# Patient Record
Sex: MALE | Race: WHITE | Employment: PART TIME | URBAN - METROPOLITAN AREA
[De-identification: names, ages, dates, MRNs, and addresses within clinical notes are randomized per-mention and may not be internally consistent; named-entity substitution may affect disease eponyms.]

---

## 2017-05-11 ENCOUNTER — HOSPITAL ENCOUNTER (OUTPATIENT)
Dept: GENERAL RADIOLOGY | Age: 52
Discharge: HOME | End: 2017-05-11
Attending: INTERNAL MEDICINE | Admitting: INTERNAL MEDICINE

## 2017-05-11 LAB
ANION GAP SERPL CALCULATED.3IONS-SCNC: 19 MMOL/L (ref 4–16)
APTT: 30.4 SECONDS (ref 21.2–33)
BASOPHILS ABSOLUTE: 0.1 K/CU MM
BASOPHILS RELATIVE PERCENT: 0.7 % (ref 0–1)
BUN BLDV-MCNC: 9 MG/DL (ref 6–23)
CALCIUM SERPL-MCNC: 9.2 MG/DL (ref 8.3–10.6)
CHLORIDE BLD-SCNC: 97 MMOL/L (ref 99–110)
CO2: 23 MMOL/L (ref 21–32)
CREAT SERPL-MCNC: 0.8 MG/DL (ref 0.9–1.3)
DIFFERENTIAL TYPE: ABNORMAL
EOSINOPHILS ABSOLUTE: 0 K/CU MM
EOSINOPHILS RELATIVE PERCENT: 0.2 % (ref 0–3)
GFR AFRICAN AMERICAN: >60 ML/MIN/1.73M2
GFR NON-AFRICAN AMERICAN: >60 ML/MIN/1.73M2
GLUCOSE BLD-MCNC: 103 MG/DL (ref 70–140)
HCT VFR BLD CALC: 45.4 % (ref 42–52)
HEMOGLOBIN: 16.3 GM/DL (ref 13.5–18)
IMMATURE NEUTROPHIL %: 0.2 % (ref 0–0.43)
INR BLD: 1.07 INDEX
LYMPHOCYTES ABSOLUTE: 1.4 K/CU MM
LYMPHOCYTES RELATIVE PERCENT: 13 % (ref 24–44)
MACROCYTES: ABNORMAL
MCH RBC QN AUTO: 43 PG (ref 27–31)
MCHC RBC AUTO-ENTMCNC: 35.9 % (ref 32–36)
MCV RBC AUTO: 119.8 FL (ref 78–100)
MONOCYTES ABSOLUTE: 1 K/CU MM
MONOCYTES RELATIVE PERCENT: 9.9 % (ref 0–4)
NUCLEATED RBC %: 0 %
PDW BLD-RTO: 13.2 % (ref 11.7–14.9)
PLATELET # BLD: 263 K/CU MM (ref 140–440)
PMV BLD AUTO: 10.5 FL (ref 7.5–11.1)
POLYCHROMASIA: ABNORMAL
POTASSIUM SERPL-SCNC: 4.3 MMOL/L (ref 3.5–5.1)
PROTHROMBIN TIME: 12.2 SECONDS (ref 9.12–12.5)
RBC # BLD: 3.79 M/CU MM (ref 4.6–6.2)
SEGMENTED NEUTROPHILS ABSOLUTE COUNT: 8 K/CU MM
SEGMENTED NEUTROPHILS RELATIVE PERCENT: 76 % (ref 36–66)
SODIUM BLD-SCNC: 139 MMOL/L (ref 135–145)
TOTAL IMMATURE NEUTOROPHIL: 0.02 K/CU MM
TOTAL NUCLEATED RBC: 0 K/CU MM
WBC # BLD: 10.5 K/CU MM (ref 4–10.5)

## 2017-05-14 PROBLEM — I21.4 NSTEMI (NON-ST ELEVATED MYOCARDIAL INFARCTION) (HCC): Status: ACTIVE | Noted: 2017-05-14

## 2017-06-19 ENCOUNTER — HOSPITAL ENCOUNTER (OUTPATIENT)
Dept: GENERAL RADIOLOGY | Age: 52
Discharge: HOME | End: 2017-06-19
Attending: INTERNAL MEDICINE | Admitting: INTERNAL MEDICINE

## 2017-06-19 LAB
ALBUMIN SERPL-MCNC: 4.1 GM/DL (ref 3.4–5)
ALP BLD-CCNC: 94 IU/L (ref 40–129)
ALT SERPL-CCNC: 14 U/L (ref 10–40)
ANION GAP SERPL CALCULATED.3IONS-SCNC: 14 MMOL/L (ref 4–16)
AST SERPL-CCNC: 25 IU/L (ref 15–37)
BASOPHILS ABSOLUTE: 0.2 K/CU MM
BASOPHILS RELATIVE PERCENT: 2 % (ref 0–1)
BILIRUB SERPL-MCNC: 0.5 MG/DL (ref 0–1)
BUN BLDV-MCNC: 14 MG/DL (ref 6–23)
CALCIUM SERPL-MCNC: 9.1 MG/DL (ref 8.3–10.6)
CHLORIDE BLD-SCNC: 90 MMOL/L (ref 99–110)
CO2: 26 MMOL/L (ref 21–32)
CREAT SERPL-MCNC: 1.1 MG/DL (ref 0.9–1.3)
DIFFERENTIAL TYPE: ABNORMAL
EOSINOPHILS ABSOLUTE: 0.2 K/CU MM
EOSINOPHILS RELATIVE PERCENT: 2.2 % (ref 0–3)
GFR AFRICAN AMERICAN: >60 ML/MIN/1.73M2
GFR NON-AFRICAN AMERICAN: >60 ML/MIN/1.73M2
GLUCOSE BLD-MCNC: 111 MG/DL (ref 70–140)
HCT VFR BLD CALC: 42.1 % (ref 42–52)
HEMOGLOBIN: 14.8 GM/DL (ref 13.5–18)
IMMATURE NEUTROPHIL %: 0.5 % (ref 0–0.43)
LYMPHOCYTES ABSOLUTE: 2.1 K/CU MM
LYMPHOCYTES RELATIVE PERCENT: 26.3 % (ref 24–44)
MCH RBC QN AUTO: 39.6 PG (ref 27–31)
MCHC RBC AUTO-ENTMCNC: 35.2 % (ref 32–36)
MCV RBC AUTO: 112.6 FL (ref 78–100)
MONOCYTES ABSOLUTE: 1 K/CU MM
MONOCYTES RELATIVE PERCENT: 12.6 % (ref 0–4)
NUCLEATED RBC %: 0 %
PDW BLD-RTO: 15 % (ref 11.7–14.9)
PLATELET # BLD: 319 K/CU MM (ref 140–440)
PMV BLD AUTO: 10.2 FL (ref 7.5–11.1)
POTASSIUM SERPL-SCNC: 4.6 MMOL/L (ref 3.5–5.1)
RBC # BLD: 3.74 M/CU MM (ref 4.6–6.2)
SEGMENTED NEUTROPHILS ABSOLUTE COUNT: 4.4 K/CU MM
SEGMENTED NEUTROPHILS RELATIVE PERCENT: 56.4 % (ref 36–66)
SODIUM BLD-SCNC: 130 MMOL/L (ref 135–145)
TOTAL IMMATURE NEUTOROPHIL: 0.04 K/CU MM
TOTAL NUCLEATED RBC: 0 K/CU MM
TOTAL PROTEIN: 6.5 GM/DL (ref 6.4–8.2)
WBC # BLD: 7.9 K/CU MM (ref 4–10.5)

## 2017-06-21 PROBLEM — Z95.1 S/P CABG (CORONARY ARTERY BYPASS GRAFT): Status: ACTIVE | Noted: 2017-06-21

## 2017-12-12 PROBLEM — J18.9 PNEUMONIA: Status: ACTIVE | Noted: 2017-12-12

## 2019-07-10 ENCOUNTER — APPOINTMENT (OUTPATIENT)
Dept: CT IMAGING | Age: 54
End: 2019-07-10
Payer: COMMERCIAL

## 2019-07-10 ENCOUNTER — HOSPITAL ENCOUNTER (OUTPATIENT)
Age: 54
Setting detail: OBSERVATION
Discharge: HOME | End: 2019-07-12
Attending: EMERGENCY MEDICINE | Admitting: FAMILY MEDICINE
Payer: COMMERCIAL

## 2019-07-10 DIAGNOSIS — E87.6 HYPOKALEMIA: ICD-10-CM

## 2019-07-10 DIAGNOSIS — F41.8 DEPRESSION WITH ANXIETY: ICD-10-CM

## 2019-07-10 DIAGNOSIS — E83.42 HYPOMAGNESEMIA: ICD-10-CM

## 2019-07-10 DIAGNOSIS — I71.40 ABDOMINAL AORTIC ANEURYSM (AAA) WITHOUT RUPTURE: ICD-10-CM

## 2019-07-10 DIAGNOSIS — R11.2 NON-INTRACTABLE VOMITING WITH NAUSEA, UNSPECIFIED VOMITING TYPE: Primary | ICD-10-CM

## 2019-07-10 DIAGNOSIS — R94.31 PROLONGED QT INTERVAL: ICD-10-CM

## 2019-07-10 PROBLEM — E87.8 ELECTROLYTE DISTURBANCE: Status: ACTIVE | Noted: 2019-07-10

## 2019-07-10 LAB
ADENOVIRUS F 40 41 PCR: NOT DETECTED
ALBUMIN SERPL-MCNC: 3.1 GM/DL (ref 3.4–5)
ALP BLD-CCNC: 64 IU/L (ref 40–128)
ALT SERPL-CCNC: 8 U/L (ref 10–40)
AMPHETAMINES: NEGATIVE
ANION GAP SERPL CALCULATED.3IONS-SCNC: 14 MMOL/L (ref 4–16)
AST SERPL-CCNC: 24 IU/L (ref 15–37)
ASTROVIRUS PCR: NOT DETECTED
BACTERIA: NEGATIVE /HPF
BARBITURATE SCREEN URINE: NEGATIVE
BASOPHILS ABSOLUTE: 0.1 K/CU MM
BASOPHILS RELATIVE PERCENT: 0.5 % (ref 0–1)
BENZODIAZEPINE SCREEN, URINE: NEGATIVE
BILIRUB SERPL-MCNC: 0.8 MG/DL (ref 0–1)
BILIRUBIN URINE: NEGATIVE MG/DL
BLOOD, URINE: ABNORMAL
BUN BLDV-MCNC: 9 MG/DL (ref 6–23)
CALCIUM SERPL-MCNC: 8.1 MG/DL (ref 8.3–10.6)
CAMPYLOBACTER PCR: NOT DETECTED
CANNABINOID SCREEN URINE: NEGATIVE
CHLORIDE BLD-SCNC: 84 MMOL/L (ref 99–110)
CLARITY: CLEAR
CO2: 35 MMOL/L (ref 21–32)
COCAINE METABOLITE: NEGATIVE
COLOR: YELLOW
CREAT SERPL-MCNC: 0.8 MG/DL (ref 0.9–1.3)
CRYPTOSPORIDIUM PCR: NOT DETECTED
CYCLOSPORA CAYETANENSIS PCR: NOT DETECTED
DIFFERENTIAL TYPE: ABNORMAL
E COLI 0157 PCR: NOT DETECTED
E COLI ENTEROAGGREGATIVE PCR: NOT DETECTED
E COLI ENTEROPATHOGENIC PCR: NOT DETECTED
E COLI ENTEROTOXIGENIC PCR: NOT DETECTED
E COLI SHIGA LIKE TOXIN PCR: NOT DETECTED
E COLI SHIGELLA/ENTEROINVASIVE PCR: NOT DETECTED
ENTAMOEBA HISTOLYTICA PCR: NOT DETECTED
EOSINOPHILS ABSOLUTE: 0.1 K/CU MM
EOSINOPHILS RELATIVE PERCENT: 0.6 % (ref 0–3)
GFR AFRICAN AMERICAN: >60 ML/MIN/1.73M2
GFR NON-AFRICAN AMERICAN: >60 ML/MIN/1.73M2
GIARDIA LAMBLIA PCR: NOT DETECTED
GLUCOSE BLD-MCNC: 90 MG/DL (ref 70–99)
GLUCOSE, URINE: NEGATIVE MG/DL
HCT VFR BLD CALC: 39.2 % (ref 42–52)
HEMOGLOBIN: 14.1 GM/DL (ref 13.5–18)
HYALINE CASTS: 2 /LPF
IMMATURE NEUTROPHIL %: 0.5 % (ref 0–0.43)
KETONES, URINE: NEGATIVE MG/DL
LEUKOCYTE ESTERASE, URINE: NEGATIVE
LIPASE: 11 IU/L (ref 13–60)
LYMPHOCYTES ABSOLUTE: 1.2 K/CU MM
LYMPHOCYTES RELATIVE PERCENT: 11.7 % (ref 24–44)
MAGNESIUM: 1.5 MG/DL (ref 1.8–2.4)
MCH RBC QN AUTO: 38 PG (ref 27–31)
MCHC RBC AUTO-ENTMCNC: 36 % (ref 32–36)
MCV RBC AUTO: 105.7 FL (ref 78–100)
MONOCYTES ABSOLUTE: 0.7 K/CU MM
MONOCYTES RELATIVE PERCENT: 6.3 % (ref 0–4)
MUCUS: ABNORMAL HPF
NITRITE URINE, QUANTITATIVE: NEGATIVE
NOROVIRUS GI GII PCR: NOT DETECTED
NUCLEATED RBC %: 0 %
OPIATES, URINE: NEGATIVE
OXYCODONE: NORMAL
PDW BLD-RTO: 15.2 % (ref 11.7–14.9)
PH, URINE: 6 (ref 5–8)
PHENCYCLIDINE, URINE: NEGATIVE
PLATELET # BLD: 244 K/CU MM (ref 140–440)
PLESIOMONAS SHIGELLOIDES PCR: NOT DETECTED
PMV BLD AUTO: 9.9 FL (ref 7.5–11.1)
POTASSIUM SERPL-SCNC: ABNORMAL MMOL/L (ref 3.5–5.1)
PROTEIN UA: NEGATIVE MG/DL
RBC # BLD: 3.71 M/CU MM (ref 4.6–6.2)
RBC URINE: 1 /HPF (ref 0–3)
ROTAVIRUS A PCR: NOT DETECTED
SALMONELLA PCR: NOT DETECTED
SAPOVIRUS PCR: NOT DETECTED
SEGMENTED NEUTROPHILS ABSOLUTE COUNT: 8.4 K/CU MM
SEGMENTED NEUTROPHILS RELATIVE PERCENT: 80.4 % (ref 36–66)
SODIUM BLD-SCNC: 133 MMOL/L (ref 135–145)
SPECIFIC GRAVITY UA: 1.03 (ref 1–1.03)
SPECIFIC GRAVITY UA: ABNORMAL (ref 1–1.03)
SQUAMOUS EPITHELIAL: <1 /HPF
TOTAL IMMATURE NEUTOROPHIL: 0.05 K/CU MM
TOTAL NUCLEATED RBC: 0 K/CU MM
TOTAL PROTEIN: 5.3 GM/DL (ref 6.4–8.2)
TRICHOMONAS: ABNORMAL /HPF
TROPONIN T: <0.01 NG/ML
UROBILINOGEN, URINE: NORMAL MG/DL (ref 0.2–1)
VIBRIO CHOLERAE PCR: NOT DETECTED
VIBRIO PCR: NOT DETECTED
WBC # BLD: 10.5 K/CU MM (ref 4–10.5)
WBC UA: 1 /HPF (ref 0–2)
YERSINIA ENTEROCOLITICA PCR: NOT DETECTED

## 2019-07-10 PROCEDURE — 85025 COMPLETE CBC W/AUTO DIFF WBC: CPT

## 2019-07-10 PROCEDURE — 2580000003 HC RX 258: Performed by: EMERGENCY MEDICINE

## 2019-07-10 PROCEDURE — 96366 THER/PROPH/DIAG IV INF ADDON: CPT

## 2019-07-10 PROCEDURE — 36415 COLL VENOUS BLD VENIPUNCTURE: CPT

## 2019-07-10 PROCEDURE — G0378 HOSPITAL OBSERVATION PER HR: HCPCS

## 2019-07-10 PROCEDURE — 2500000003 HC RX 250 WO HCPCS: Performed by: NURSE PRACTITIONER

## 2019-07-10 PROCEDURE — 96376 TX/PRO/DX INJ SAME DRUG ADON: CPT

## 2019-07-10 PROCEDURE — 74177 CT ABD & PELVIS W/CONTRAST: CPT

## 2019-07-10 PROCEDURE — 83690 ASSAY OF LIPASE: CPT

## 2019-07-10 PROCEDURE — 83735 ASSAY OF MAGNESIUM: CPT

## 2019-07-10 PROCEDURE — 6370000000 HC RX 637 (ALT 250 FOR IP): Performed by: NURSE PRACTITIONER

## 2019-07-10 PROCEDURE — 96361 HYDRATE IV INFUSION ADD-ON: CPT

## 2019-07-10 PROCEDURE — 93010 ELECTROCARDIOGRAM REPORT: CPT | Performed by: INTERNAL MEDICINE

## 2019-07-10 PROCEDURE — 94761 N-INVAS EAR/PLS OXIMETRY MLT: CPT

## 2019-07-10 PROCEDURE — 72131 CT LUMBAR SPINE W/O DYE: CPT

## 2019-07-10 PROCEDURE — 6360000002 HC RX W HCPCS: Performed by: EMERGENCY MEDICINE

## 2019-07-10 PROCEDURE — 99285 EMERGENCY DEPT VISIT HI MDM: CPT

## 2019-07-10 PROCEDURE — 6370000000 HC RX 637 (ALT 250 FOR IP): Performed by: PHYSICIAN ASSISTANT

## 2019-07-10 PROCEDURE — 96365 THER/PROPH/DIAG IV INF INIT: CPT

## 2019-07-10 PROCEDURE — 94640 AIRWAY INHALATION TREATMENT: CPT

## 2019-07-10 PROCEDURE — 96372 THER/PROPH/DIAG INJ SC/IM: CPT

## 2019-07-10 PROCEDURE — 87507 IADNA-DNA/RNA PROBE TQ 12-25: CPT

## 2019-07-10 PROCEDURE — 87324 CLOSTRIDIUM AG IA: CPT

## 2019-07-10 PROCEDURE — 80307 DRUG TEST PRSMV CHEM ANLYZR: CPT

## 2019-07-10 PROCEDURE — 84484 ASSAY OF TROPONIN QUANT: CPT

## 2019-07-10 PROCEDURE — 93005 ELECTROCARDIOGRAM TRACING: CPT | Performed by: EMERGENCY MEDICINE

## 2019-07-10 PROCEDURE — 6360000004 HC RX CONTRAST MEDICATION: Performed by: EMERGENCY MEDICINE

## 2019-07-10 PROCEDURE — 80053 COMPREHEN METABOLIC PANEL: CPT

## 2019-07-10 PROCEDURE — 96375 TX/PRO/DX INJ NEW DRUG ADDON: CPT

## 2019-07-10 PROCEDURE — 6360000002 HC RX W HCPCS: Performed by: NURSE PRACTITIONER

## 2019-07-10 PROCEDURE — 81001 URINALYSIS AUTO W/SCOPE: CPT

## 2019-07-10 PROCEDURE — C9113 INJ PANTOPRAZOLE SODIUM, VIA: HCPCS | Performed by: NURSE PRACTITIONER

## 2019-07-10 PROCEDURE — 2580000003 HC RX 258: Performed by: NURSE PRACTITIONER

## 2019-07-10 PROCEDURE — 6370000000 HC RX 637 (ALT 250 FOR IP): Performed by: EMERGENCY MEDICINE

## 2019-07-10 RX ORDER — SODIUM CHLORIDE 0.9 % (FLUSH) 0.9 %
10 SYRINGE (ML) INJECTION PRN
Status: DISCONTINUED | OUTPATIENT
Start: 2019-07-10 | End: 2019-07-12 | Stop reason: HOSPADM

## 2019-07-10 RX ORDER — LORAZEPAM 2 MG/ML
3 INJECTION INTRAMUSCULAR
Status: DISCONTINUED | OUTPATIENT
Start: 2019-07-10 | End: 2019-07-12 | Stop reason: HOSPADM

## 2019-07-10 RX ORDER — SODIUM CHLORIDE 0.9 % (FLUSH) 0.9 %
10 SYRINGE (ML) INJECTION EVERY 12 HOURS SCHEDULED
Status: DISCONTINUED | OUTPATIENT
Start: 2019-07-10 | End: 2019-07-12 | Stop reason: HOSPADM

## 2019-07-10 RX ORDER — GUAIFENESIN 600 MG/1
600 TABLET, EXTENDED RELEASE ORAL 2 TIMES DAILY
Status: DISCONTINUED | OUTPATIENT
Start: 2019-07-10 | End: 2019-07-12 | Stop reason: HOSPADM

## 2019-07-10 RX ORDER — LORAZEPAM 2 MG/ML
2 INJECTION INTRAMUSCULAR
Status: DISCONTINUED | OUTPATIENT
Start: 2019-07-10 | End: 2019-07-12 | Stop reason: HOSPADM

## 2019-07-10 RX ORDER — CARVEDILOL 3.12 MG/1
3.12 TABLET ORAL 2 TIMES DAILY WITH MEALS
Status: DISCONTINUED | OUTPATIENT
Start: 2019-07-10 | End: 2019-07-12 | Stop reason: HOSPADM

## 2019-07-10 RX ORDER — 0.9 % SODIUM CHLORIDE 0.9 %
1000 INTRAVENOUS SOLUTION INTRAVENOUS ONCE
Status: COMPLETED | OUTPATIENT
Start: 2019-07-10 | End: 2019-07-10

## 2019-07-10 RX ORDER — LORAZEPAM 1 MG/1
3 TABLET ORAL
Status: DISCONTINUED | OUTPATIENT
Start: 2019-07-10 | End: 2019-07-12 | Stop reason: HOSPADM

## 2019-07-10 RX ORDER — MAGNESIUM SULFATE 1 G/100ML
1 INJECTION INTRAVENOUS PRN
Status: DISCONTINUED | OUTPATIENT
Start: 2019-07-10 | End: 2019-07-10

## 2019-07-10 RX ORDER — TRAZODONE HYDROCHLORIDE 50 MG/1
50 TABLET ORAL NIGHTLY
Status: DISCONTINUED | OUTPATIENT
Start: 2019-07-10 | End: 2019-07-12 | Stop reason: HOSPADM

## 2019-07-10 RX ORDER — FLUOXETINE HYDROCHLORIDE 20 MG/1
20 CAPSULE ORAL DAILY
Status: DISCONTINUED | OUTPATIENT
Start: 2019-07-11 | End: 2019-07-12 | Stop reason: HOSPADM

## 2019-07-10 RX ORDER — POTASSIUM CHLORIDE 1.5 G/1.77G
40 POWDER, FOR SOLUTION ORAL PRN
Status: DISCONTINUED | OUTPATIENT
Start: 2019-07-10 | End: 2019-07-10

## 2019-07-10 RX ORDER — SENNA PLUS 8.6 MG/1
1 TABLET ORAL DAILY PRN
Status: DISCONTINUED | OUTPATIENT
Start: 2019-07-10 | End: 2019-07-12 | Stop reason: HOSPADM

## 2019-07-10 RX ORDER — MAGNESIUM SULFATE 1 G/100ML
1 INJECTION INTRAVENOUS PRN
Status: DISCONTINUED | OUTPATIENT
Start: 2019-07-11 | End: 2019-07-12

## 2019-07-10 RX ORDER — LORATADINE 10 MG/1
10 TABLET ORAL DAILY
COMMUNITY

## 2019-07-10 RX ORDER — LORAZEPAM 1 MG/1
2 TABLET ORAL
Status: DISCONTINUED | OUTPATIENT
Start: 2019-07-10 | End: 2019-07-12 | Stop reason: HOSPADM

## 2019-07-10 RX ORDER — MAGNESIUM SULFATE IN WATER 40 MG/ML
2 INJECTION, SOLUTION INTRAVENOUS ONCE
Status: COMPLETED | OUTPATIENT
Start: 2019-07-10 | End: 2019-07-10

## 2019-07-10 RX ORDER — FLUTICASONE PROPIONATE 50 MCG
1 SPRAY, SUSPENSION (ML) NASAL DAILY
Status: DISCONTINUED | OUTPATIENT
Start: 2019-07-10 | End: 2019-07-12 | Stop reason: HOSPADM

## 2019-07-10 RX ORDER — PANTOPRAZOLE SODIUM 40 MG/10ML
40 INJECTION, POWDER, LYOPHILIZED, FOR SOLUTION INTRAVENOUS DAILY
Status: DISCONTINUED | OUTPATIENT
Start: 2019-07-10 | End: 2019-07-12 | Stop reason: HOSPADM

## 2019-07-10 RX ORDER — LORAZEPAM 2 MG/ML
1 INJECTION INTRAMUSCULAR
Status: DISCONTINUED | OUTPATIENT
Start: 2019-07-10 | End: 2019-07-12 | Stop reason: HOSPADM

## 2019-07-10 RX ORDER — POTASSIUM CHLORIDE 20 MEQ/1
40 TABLET, EXTENDED RELEASE ORAL PRN
Status: DISCONTINUED | OUTPATIENT
Start: 2019-07-10 | End: 2019-07-12

## 2019-07-10 RX ORDER — HYDROCODONE BITARTRATE AND ACETAMINOPHEN 5; 325 MG/1; MG/1
1 TABLET ORAL
Status: COMPLETED | OUTPATIENT
Start: 2019-07-10 | End: 2019-07-10

## 2019-07-10 RX ORDER — POTASSIUM CHLORIDE 7.45 MG/ML
10 INJECTION INTRAVENOUS PRN
Status: DISCONTINUED | OUTPATIENT
Start: 2019-07-10 | End: 2019-07-12

## 2019-07-10 RX ORDER — LORAZEPAM 1 MG/1
4 TABLET ORAL
Status: DISCONTINUED | OUTPATIENT
Start: 2019-07-10 | End: 2019-07-12 | Stop reason: HOSPADM

## 2019-07-10 RX ORDER — POTASSIUM CHLORIDE 20MEQ/15ML
40 LIQUID (ML) ORAL PRN
Status: DISCONTINUED | OUTPATIENT
Start: 2019-07-10 | End: 2019-07-12

## 2019-07-10 RX ORDER — ASPIRIN 81 MG/1
81 TABLET ORAL DAILY
Status: DISCONTINUED | OUTPATIENT
Start: 2019-07-11 | End: 2019-07-12 | Stop reason: HOSPADM

## 2019-07-10 RX ORDER — LORAZEPAM 1 MG/1
1 TABLET ORAL
Status: DISCONTINUED | OUTPATIENT
Start: 2019-07-10 | End: 2019-07-12 | Stop reason: HOSPADM

## 2019-07-10 RX ORDER — TRAZODONE HYDROCHLORIDE 50 MG/1
50 TABLET ORAL NIGHTLY
COMMUNITY

## 2019-07-10 RX ORDER — POTASSIUM CHLORIDE 20 MEQ/1
40 TABLET, EXTENDED RELEASE ORAL PRN
Status: DISCONTINUED | OUTPATIENT
Start: 2019-07-10 | End: 2019-07-10

## 2019-07-10 RX ORDER — LISINOPRIL 2.5 MG/1
2.5 TABLET ORAL DAILY
Status: DISCONTINUED | OUTPATIENT
Start: 2019-07-11 | End: 2019-07-12 | Stop reason: HOSPADM

## 2019-07-10 RX ORDER — PROMETHAZINE HYDROCHLORIDE 25 MG/ML
25 INJECTION, SOLUTION INTRAMUSCULAR; INTRAVENOUS EVERY 6 HOURS PRN
Status: DISCONTINUED | OUTPATIENT
Start: 2019-07-10 | End: 2019-07-12 | Stop reason: HOSPADM

## 2019-07-10 RX ORDER — 0.9 % SODIUM CHLORIDE 0.9 %
10 VIAL (ML) INJECTION
Status: COMPLETED | OUTPATIENT
Start: 2019-07-10 | End: 2019-07-10

## 2019-07-10 RX ORDER — ASPIRIN 81 MG/1
81 TABLET ORAL DAILY
COMMUNITY

## 2019-07-10 RX ORDER — IPRATROPIUM BROMIDE AND ALBUTEROL SULFATE 2.5; .5 MG/3ML; MG/3ML
1 SOLUTION RESPIRATORY (INHALATION)
Status: DISCONTINUED | OUTPATIENT
Start: 2019-07-10 | End: 2019-07-12 | Stop reason: HOSPADM

## 2019-07-10 RX ORDER — SPIRONOLACTONE 25 MG/1
12.5 TABLET ORAL DAILY
Status: DISCONTINUED | OUTPATIENT
Start: 2019-07-11 | End: 2019-07-12 | Stop reason: HOSPADM

## 2019-07-10 RX ORDER — ACETAMINOPHEN 325 MG/1
650 TABLET ORAL EVERY 4 HOURS PRN
Status: DISCONTINUED | OUTPATIENT
Start: 2019-07-10 | End: 2019-07-12 | Stop reason: HOSPADM

## 2019-07-10 RX ORDER — ONDANSETRON 2 MG/ML
4 INJECTION INTRAMUSCULAR; INTRAVENOUS EVERY 6 HOURS PRN
Status: DISCONTINUED | OUTPATIENT
Start: 2019-07-10 | End: 2019-07-12 | Stop reason: HOSPADM

## 2019-07-10 RX ORDER — CETIRIZINE HYDROCHLORIDE 10 MG/1
5 TABLET ORAL DAILY
Status: DISCONTINUED | OUTPATIENT
Start: 2019-07-11 | End: 2019-07-12 | Stop reason: HOSPADM

## 2019-07-10 RX ORDER — SODIUM CHLORIDE 9 MG/ML
INJECTION, SOLUTION INTRAVENOUS CONTINUOUS
Status: DISCONTINUED | OUTPATIENT
Start: 2019-07-10 | End: 2019-07-11

## 2019-07-10 RX ORDER — POTASSIUM CHLORIDE 7.45 MG/ML
10 INJECTION INTRAVENOUS PRN
Status: DISCONTINUED | OUTPATIENT
Start: 2019-07-10 | End: 2019-07-10

## 2019-07-10 RX ORDER — LORAZEPAM 2 MG/ML
4 INJECTION INTRAMUSCULAR
Status: DISCONTINUED | OUTPATIENT
Start: 2019-07-10 | End: 2019-07-12 | Stop reason: HOSPADM

## 2019-07-10 RX ORDER — SODIUM CHLORIDE 0.9 % (FLUSH) 0.9 %
10 SYRINGE (ML) INJECTION PRN
Status: CANCELLED | OUTPATIENT
Start: 2019-07-10

## 2019-07-10 RX ADMIN — POTASSIUM CHLORIDE 10 MEQ: 10 INJECTION, SOLUTION INTRAVENOUS at 16:24

## 2019-07-10 RX ADMIN — POTASSIUM CHLORIDE 10 MEQ: 10 INJECTION, SOLUTION INTRAVENOUS at 17:31

## 2019-07-10 RX ADMIN — GUAIFENESIN 600 MG: 600 TABLET, EXTENDED RELEASE ORAL at 20:42

## 2019-07-10 RX ADMIN — POTASSIUM CHLORIDE 10 MEQ: 10 INJECTION, SOLUTION INTRAVENOUS at 19:08

## 2019-07-10 RX ADMIN — HYDROCODONE BITARTRATE AND ACETAMINOPHEN 1 TABLET: 5; 325 TABLET ORAL at 21:22

## 2019-07-10 RX ADMIN — POTASSIUM CHLORIDE 10 MEQ: 10 INJECTION, SOLUTION INTRAVENOUS at 20:42

## 2019-07-10 RX ADMIN — SODIUM CHLORIDE 1000 ML: 9 INJECTION, SOLUTION INTRAVENOUS at 12:48

## 2019-07-10 RX ADMIN — ENOXAPARIN SODIUM 40 MG: 40 INJECTION SUBCUTANEOUS at 17:31

## 2019-07-10 RX ADMIN — PANTOPRAZOLE SODIUM 40 MG: 40 INJECTION, POWDER, FOR SOLUTION INTRAVENOUS at 17:31

## 2019-07-10 RX ADMIN — POTASSIUM CHLORIDE 40 MEQ: 20 SOLUTION ORAL at 11:54

## 2019-07-10 RX ADMIN — TRAZODONE HYDROCHLORIDE 50 MG: 50 TABLET ORAL at 20:42

## 2019-07-10 RX ADMIN — MAGNESIUM SULFATE HEPTAHYDRATE 2 G: 40 INJECTION, SOLUTION INTRAVENOUS at 12:47

## 2019-07-10 RX ADMIN — SODIUM CHLORIDE 10 ML: 9 INJECTION, SOLUTION INTRAMUSCULAR; INTRAVENOUS; SUBCUTANEOUS at 11:49

## 2019-07-10 RX ADMIN — IPRATROPIUM BROMIDE AND ALBUTEROL SULFATE 1 AMPULE: .5; 3 SOLUTION RESPIRATORY (INHALATION) at 21:50

## 2019-07-10 RX ADMIN — SODIUM CHLORIDE: 9 INJECTION, SOLUTION INTRAVENOUS at 16:05

## 2019-07-10 RX ADMIN — POTASSIUM CHLORIDE 10 MEQ: 10 INJECTION, SOLUTION INTRAVENOUS at 21:48

## 2019-07-10 RX ADMIN — IOPAMIDOL 75 ML: 755 INJECTION, SOLUTION INTRAVENOUS at 11:49

## 2019-07-10 RX ADMIN — FOLIC ACID: 5 INJECTION, SOLUTION INTRAMUSCULAR; INTRAVENOUS; SUBCUTANEOUS at 21:49

## 2019-07-10 RX ADMIN — SODIUM CHLORIDE 1000 ML: 9 INJECTION, SOLUTION INTRAVENOUS at 11:20

## 2019-07-10 RX ADMIN — POTASSIUM CHLORIDE 10 MEQ: 10 INJECTION, SOLUTION INTRAVENOUS at 14:53

## 2019-07-10 ASSESSMENT — PAIN DESCRIPTION - PAIN TYPE
TYPE: ACUTE PAIN
TYPE: CHRONIC PAIN

## 2019-07-10 ASSESSMENT — PAIN DESCRIPTION - DESCRIPTORS: DESCRIPTORS: ACHING;DISCOMFORT

## 2019-07-10 ASSESSMENT — PAIN SCALES - GENERAL
PAINLEVEL_OUTOF10: 0
PAINLEVEL_OUTOF10: 9
PAINLEVEL_OUTOF10: 9

## 2019-07-10 ASSESSMENT — PAIN DESCRIPTION - LOCATION
LOCATION: BACK
LOCATION: BACK

## 2019-07-10 ASSESSMENT — PAIN DESCRIPTION - PROGRESSION: CLINICAL_PROGRESSION: NOT CHANGED

## 2019-07-10 ASSESSMENT — PAIN DESCRIPTION - ORIENTATION: ORIENTATION: MID;LOWER

## 2019-07-10 ASSESSMENT — PAIN DESCRIPTION - ONSET: ONSET: ON-GOING

## 2019-07-10 ASSESSMENT — PAIN DESCRIPTION - FREQUENCY: FREQUENCY: CONTINUOUS

## 2019-07-10 ASSESSMENT — PAIN - FUNCTIONAL ASSESSMENT: PAIN_FUNCTIONAL_ASSESSMENT: ACTIVITIES ARE NOT PREVENTED

## 2019-07-10 NOTE — ED NOTES
[id number] Assistant at the bedside at this time.      Nikki Augustine RN  07/10/19 9862

## 2019-07-10 NOTE — H&P
History and Physical      Name:  Surya Rios /Age/Sex: 1965  (48 y.o. male)   MRN & CSN:  [id number] & [id number] Admission Date/Time: 7/10/2019 10:22 AM   Location:  ED15/ED-15 PCP: Saintclair Bali, [address] [facility] Day: 1        Admitting Physician: Dr. Melvin Valerio and Plan:   Surya Rios is a 48 y.o. male who presents with  Emesis (2 days, last drank alcohol yesterday) and Back Pain (worse with movement, middle back, radiating up to shoulders and down to knees)     Electrolyte disturbances   Hyponatremia (133)    Hypokalemia (2.7)   Hypomagnesemia (1.5)    Replacement protocol initiated      Intractable N/V- suspect gastritis component   PRN antiemetics   IV hydration   Monitor labs     Pneumonitis- possibly 2/2 aspiration with several days of vomitting. Does not appears to    Respiratory interventions- IS, supplemental oxygen, spot check pulse oximetry, and TCDB. Add prn bronchodilators, expectorants, and antitussives.  Alcohol abuse 1/2 liter vodka per day. Last drink 19   CIWA     Essential hypertension- continue home antihypertensive regimen- Monitor BP trends.  Tobacco Use   Smoking cessation protocol   Nicotine supplementation-refused         Patient case discussed with ED provider    Diet cardiac   DVT Prophylaxis [x] Lovenox, []  Heparin, [] SCDs, [] Ambulation  [] Long term AC   GI Prophylaxis [] PPI,  [] H2 Blocker,  [] Carafate,  [] Diet/Tube Feeds   Code Status Full     Disposition Admit to inpatient.     Patient plans to return home upon discharge   MDM [] Low, [x] Moderate,[]  High     -Patient assessment and plan discussed and reviewed with admitting physician: Carlos Tsang MD.     History of Present Illness:     Chief Complaint: Emesis (2 days, last drank alcohol yesterday) and Back Pain (worse with movement, middle back, radiating up to shoulders and down to knees)    Surya Rios is a 48 y.o. male who presents with above club or organization: None     Attends meetings of clubs or organizations: None     Relationship status: None    Intimate partner violence:     Fear of current or ex partner: None     Emotionally abused: None     Physically abused: None     Forced sexual activity: None   Other Topics Concern    None   Social History Narrative    None     TOBACCO:   reports that he has been smoking cigarettes. He has a 34.00 pack-year smoking history. He has never used smokeless tobacco.  ETOH:   reports that he drinks about 2.4 oz of alcohol per week. Drugs:  reports that he does not use drugs. Allergies: No Known Allergies    Home Medications:     Prior to Admission medications    Medication Sig Start Date End Date Taking? Authorizing Provider   guaiFENesin (MUCINEX) 600 MG extended release tablet Take 1 tablet by mouth 2 times daily 12/14/17   Endy Pearce MD   furosemide (LASIX) 20 MG tablet Take 1 tablet by mouth 2 times daily 5/22/17   Santino Davis MD   spironolactone (ALDACTONE) 25 MG tablet Take 0.5 tablets by mouth daily  Patient taking differently: Take 25 mg by mouth daily  5/22/17   Santino Davis MD   carvedilol (COREG) 3.125 MG tablet Take 1 tablet by mouth 2 times daily (with meals) 5/21/17   Ralph Parker MD   fluticasone (FLONASE) 50 MCG/ACT nasal spray 1 spray by Nasal route daily    Historical Provider, MD   ranolazine (RANEXA) 500 MG extended release tablet Take 1 tablet by mouth 2 times daily 5/16/17   Waleska Rodriguez MD   lisinopril (PRINIVIL;ZESTRIL) 2.5 MG tablet Take 1 tablet by mouth daily 5/16/17   Waleska Rodriguez MD   atorvastatin (LIPITOR) 10 MG tablet Take 1 tablet by mouth daily 6/17/15   Kushal Clark MD   FLUoxetine (PROZAC) 20 MG capsule Take 3 capsules by mouth daily 6/17/15   Kushal Clark MD   Cyanocobalamin (B-12) 100 MCG TABS Take 1 tablet by mouth.     Historical Provider, MD     Medications:   Medications:    magnesium sulfate  2 g Intravenous Once      Infusions:   PRN Meds:

## 2019-07-10 NOTE — ED PROVIDER NOTES
Allergies    SOCIAL AND FAMILY HISTORY    Social History     Socioeconomic History    Marital status:      Spouse name: Radha Brian Number of children: None    Years of education: None    Highest education level: None   Occupational History    None   Social Needs    Financial resource strain: None    Food insecurity:     Worry: None     Inability: None    Transportation needs:     Medical: None     Non-medical: None   Tobacco Use    Smoking status: Current Every Day Smoker     Packs/day: 1.00     Years: 34.00     Pack years: 34.00     Types: Cigarettes    Smokeless tobacco: Never Used   Substance and Sexual Activity    Alcohol use: Yes     Alcohol/week: 2.4 oz     Types: 4 Shots of liquor per week     Comment: drinks approx 1/2 liter vodka daily for \"years\"    Drug use: No    Sexual activity: Not Currently   Lifestyle    Physical activity:     Days per week: None     Minutes per session: None    Stress: None   Relationships    Social connections:     Talks on phone: None     Gets together: None     Attends [employer]ze service: None     Active member of club or organization: None     Attends meetings of clubs or organizations: None     Relationship status: None    Intimate partner violence:     Fear of current or ex partner: None     Emotionally abused: None     Physically abused: None     Forced sexual activity: None   Other Topics Concern    None   Social History Narrative    None     Family History   Problem Relation Age of Onset    Cancer Other         lung          PHYSICAL EXAM    VITAL SIGNS: BP (!) 91/54   Pulse 85   Temp 98.3 °F (36.8 °C)   Resp 20   Ht 5' 6\" (1.676 m)   Wt 174 lb (78.9 kg)   SpO2 91%   BMI 28.08 kg/m²    Constitutional:  Well developed, No acute distress. HENT:  Normocephalic, Atraumatic, PERRL. EOMI. Sclera clear. Conjunctiva normal.  Neck: supple without ridigity  Cardiovascular:  Regular rate and rhythm, No murmurs  Respiratory:  Nonlabored breathing. previous. RADIOLOGY    Ct Lumbar Spine Wo Contrast    Result Date: 7/10/2019  EXAMINATION: CT OF THE LUMBAR SPINE WITHOUT CONTRAST  7/10/2019 TECHNIQUE: CT of the lumbar spine was performed without the administration of intravenous contrast. Multiplanar reformatted images are provided for review. Dose modulation, iterative reconstruction, and/or weight based adjustment of the mA/kV was utilized to reduce the radiation dose to as low as reasonably achievable. COMPARISON: None HISTORY: ORDERING SYSTEM PROVIDED HISTORY: low back pain TECHNOLOGIST PROVIDED HISTORY: Reason for exam:->low back pain Reason for Exam: LOW BACK PAIN, NKI Acuity: Acute Type of Exam: Initial Additional signs and symptoms: NONE Relevant Medical/Surgical History: NONE FINDINGS: BONES/ALIGNMENT: There is normal alignment of the spine. The vertebral body heights are maintained. No osseous destructive lesion is seen. No acute fracture is identified. DEGENERATIVE CHANGES: Diffuse mild to moderate degenerative changes identified within the lumbar spine. At L1-L2 the central canal and neural foramina are patent. At L2-L3 annular disc bulge, facet arthropathy and ligamentum flavum hypertrophy results in mild central canal stenosis. Both foramina are moderately stenotic. At L3-L4 moderate central canal stenosis identified due to annular bulge, facet arthropathy and ligamentum flavum hypertrophy. Both foramina are moderately stenotic. At L4-L5 severe bilateral foraminal stenosis is appreciated. Left paracentral osteophyte deforms the thecal sac. Central canal is mildly stenotic. At L5-S1 severe right foraminal stenosis and moderate left foraminal stenosis are identified. Shallow disc bulge flattens the thecal sac. SOFT TISSUES/RETROPERITONEUM: No paraspinal mass is seen. No evidence of acute fracture. Moderate central canal stenosis and moderate bilateral foraminal stenosis at L3-L4. Mild central canal stenosis at L2-L3 and L4-L5.   Moderate severe bilateral foraminal stenosis is noted. Moderate to severe bilateral foraminal stenosis at L5-S1 greater on the right. Ct Abdomen Pelvis W Iv Contrast    Result Date: 7/10/2019  EXAMINATION: CT OF THE ABDOMEN AND PELVIS WITH CONTRAST 7/10/2019 11:48 am TECHNIQUE: CT of the abdomen and pelvis was performed with the administration of intravenous contrast. Multiplanar reformatted images are provided for review. Dose modulation, iterative reconstruction, and/or weight based adjustment of the mA/kV was utilized to reduce the radiation dose to as low as reasonably achievable. COMPARISON: 05/13/2017. HISTORY: ORDERING SYSTEM PROVIDED HISTORY: epigastric pain, nausea, vomiting TECHNOLOGIST PROVIDED HISTORY: IV contrast only. Thank you. Reason for Exam: epigastric pain, nausea, vomiting Acuity: Acute Type of Exam: Initial Additional signs and symptoms: NONE Relevant Medical/Surgical History: 75ML SIOVUE 370/ GFR>60 FINDINGS: Lower Chest: The lung bases demonstrate patchy areas of ground-glass opacity. Organs: The liver, spleen, gallbladder, pancreas and adrenal glands appear unremarkable. There is symmetric enhancement of the kidneys. No hydronephrosis is seen. GI/Bowel: Evaluation of the bowel is limited as no enteric contrast was given. No dilated loops of bowel are seen. The appendix is not dilated. Pelvis: No pelvic masses or fluid collections are seen. Peritoneum/Retroperitoneum: The abdominal aorta is mildly aneurysmal measuring 2.8 x 2.8 cm. No retroperitoneal or mesenteric lymphadenopathy is seen. Bones/Soft Tissues: There are degenerative changes involving the spine and right hip. No acute bony abnormalities are noted. There is a fluid collections seen adjacent to the right hip measuring 3.8 x 1.9 cm.     1. Patchy areas of ground-glass opacity involving the lung bases likely representing pneumonitis. 2. No acute intra-abdominal abnormality. 3. No acute intrapelvic abnormality.  4. Mild aneurysmal

## 2019-07-10 NOTE — ED NOTES
Given urinal and curtain pulled for privacy. The patient sts he has been nauseated and coughing up phlegm times 3 days. Soft stools but not diarrhea. He has not been eating and not actual vomiting emesis. He complains of feeling very weak. The patient has chronic back pain.       Kristina Cooley RN  07/10/19 8155

## 2019-07-10 NOTE — PROGRESS NOTES
Medication History  [facility]    Patient Name: Myah Ballard 1965     Medication history has been completed by: Marti Schulz CPhT    Source(s) of information: patient and retail pharmacist     Primary Care Physician: SAIRA Dinero - CNP     Pharmacy: [facility]    Allergies as of 07/10/2019    (No Known Allergies)        Prior to Admission medications    Medication Sig Start Date End Date Taking? Authorizing Provider   aspirin 81 MG EC tablet Take 81 mg by mouth daily   Yes Historical Provider, MD   traZODone (DESYREL) 50 MG tablet Take 50 mg by mouth nightly   Yes Historical Provider, MD   loratadine (CLARITIN) 10 MG tablet Take 10 mg by mouth daily   Yes Historical Provider, MD   furosemide (LASIX) 20 MG tablet Take 1 tablet by mouth 2 times daily  Patient taking differently: Take 20 mg by mouth daily  5/22/17  Yes Alessandra Wilson MD   spironolactone (ALDACTONE) 25 MG tablet Take 0.5 tablets by mouth daily 5/22/17  Yes Alessandra Wilson MD   carvedilol (COREG) 3.125 MG tablet Take 1 tablet by mouth 2 times daily (with meals) 5/21/17  Yes Ro Pop MD   lisinopril (PRINIVIL;ZESTRIL) 2.5 MG tablet Take 1 tablet by mouth daily 5/16/17  Yes Nora Pimentel MD   atorvastatin (LIPITOR) 10 MG tablet Take 1 tablet by mouth daily 6/17/15  Yes Chalo Vigil MD   FLUoxetine (PROZAC) 20 MG capsule Take 3 capsules by mouth daily  Patient taking differently: Take 20 mg by mouth daily  6/17/15  Yes Chalo Vigil MD   Cyanocobalamin (B-12) 100 MCG TABS Take 1 tablet by mouth. Yes Historical Provider, MD   guaiFENesin (MUCINEX) 600 MG extended release tablet Take 1 tablet by mouth 2 times daily 12/14/17   Patel Garay MD   fluticasone (FLONASE) 50 MCG/ACT nasal spray 1 spray by Nasal route daily    Historical Provider, MD     Medications added or changed (ex.  new medication, dosage change, interval change, formulation change):  Aspirin (added)  Loratadine (added)  Trazodone

## 2019-07-11 LAB
ALBUMIN SERPL-MCNC: 2.5 GM/DL (ref 3.4–5)
ALP BLD-CCNC: 50 IU/L (ref 40–128)
ALT SERPL-CCNC: 6 U/L (ref 10–40)
AMYLASE: 18 U/L (ref 25–115)
ANION GAP SERPL CALCULATED.3IONS-SCNC: 8 MMOL/L (ref 4–16)
AST SERPL-CCNC: 17 IU/L (ref 15–37)
BASOPHILS ABSOLUTE: 0 K/CU MM
BASOPHILS RELATIVE PERCENT: 0.7 % (ref 0–1)
BILIRUB SERPL-MCNC: 0.8 MG/DL (ref 0–1)
BUN BLDV-MCNC: 7 MG/DL (ref 6–23)
CALCIUM SERPL-MCNC: 7.3 MG/DL (ref 8.3–10.6)
CHLORIDE BLD-SCNC: 98 MMOL/L (ref 99–110)
CLOSTRIDIUM DIFFICILE, PCR: ABNORMAL
CLOSTRIDIUM DIFFICILE, PCR: ABNORMAL
CO2: 32 MMOL/L (ref 21–32)
CREAT SERPL-MCNC: 0.5 MG/DL (ref 0.9–1.3)
DIFFERENTIAL TYPE: ABNORMAL
EOSINOPHILS ABSOLUTE: 0.1 K/CU MM
EOSINOPHILS RELATIVE PERCENT: 1.4 % (ref 0–3)
GFR AFRICAN AMERICAN: >60 ML/MIN/1.73M2
GFR NON-AFRICAN AMERICAN: >60 ML/MIN/1.73M2
GLUCOSE BLD-MCNC: 81 MG/DL (ref 70–99)
HCT VFR BLD CALC: 36 % (ref 42–52)
HEMOGLOBIN: 12.3 GM/DL (ref 13.5–18)
IMMATURE NEUTROPHIL %: 0.5 % (ref 0–0.43)
LIPASE: 6 IU/L (ref 13–60)
LYMPHOCYTES ABSOLUTE: 1.4 K/CU MM
LYMPHOCYTES RELATIVE PERCENT: 25.2 % (ref 24–44)
MACROCYTES: ABNORMAL
MAGNESIUM: 1.9 MG/DL (ref 1.8–2.4)
MCH RBC QN AUTO: 37.6 PG (ref 27–31)
MCHC RBC AUTO-ENTMCNC: 34.2 % (ref 32–36)
MCV RBC AUTO: 110.1 FL (ref 78–100)
MONOCYTES ABSOLUTE: 0.5 K/CU MM
MONOCYTES RELATIVE PERCENT: 9.2 % (ref 0–4)
NUCLEATED RBC %: 0 %
PDW BLD-RTO: 15.6 % (ref 11.7–14.9)
PHOSPHORUS: 2.4 MG/DL (ref 2.5–4.9)
PLATELET # BLD: 213 K/CU MM (ref 140–440)
PLT MORPHOLOGY: ABNORMAL
PMV BLD AUTO: 9.9 FL (ref 7.5–11.1)
POLYCHROMASIA: ABNORMAL
POTASSIUM SERPL-SCNC: 3.6 MMOL/L (ref 3.5–5.1)
POTASSIUM SERPL-SCNC: 3.7 MMOL/L (ref 3.5–5.1)
RBC # BLD: 3.27 M/CU MM (ref 4.6–6.2)
SEGMENTED NEUTROPHILS ABSOLUTE COUNT: 3.6 K/CU MM
SEGMENTED NEUTROPHILS ABSOLUTE COUNT: ABNORMAL K/CU MM
SEGMENTED NEUTROPHILS RELATIVE PERCENT: 63 % (ref 36–66)
SODIUM BLD-SCNC: 138 MMOL/L (ref 135–145)
TOTAL IMMATURE NEUTOROPHIL: 0.03 K/CU MM
TOTAL NUCLEATED RBC: 0 K/CU MM
TOTAL PROTEIN: 4.2 GM/DL (ref 6.4–8.2)
TROPONIN T: <0.01 NG/ML
TSH HIGH SENSITIVITY: 2.74 UIU/ML (ref 0.27–4.2)
WBC # BLD: 5.6 K/CU MM (ref 4–10.5)

## 2019-07-11 PROCEDURE — G0378 HOSPITAL OBSERVATION PER HR: HCPCS

## 2019-07-11 PROCEDURE — 36415 COLL VENOUS BLD VENIPUNCTURE: CPT

## 2019-07-11 PROCEDURE — 83690 ASSAY OF LIPASE: CPT

## 2019-07-11 PROCEDURE — 94640 AIRWAY INHALATION TREATMENT: CPT

## 2019-07-11 PROCEDURE — 96367 TX/PROPH/DG ADDL SEQ IV INF: CPT

## 2019-07-11 PROCEDURE — 96366 THER/PROPH/DIAG IV INF ADDON: CPT

## 2019-07-11 PROCEDURE — 96376 TX/PRO/DX INJ SAME DRUG ADON: CPT

## 2019-07-11 PROCEDURE — 84443 ASSAY THYROID STIM HORMONE: CPT

## 2019-07-11 PROCEDURE — 2580000003 HC RX 258: Performed by: NURSE PRACTITIONER

## 2019-07-11 PROCEDURE — 96372 THER/PROPH/DIAG INJ SC/IM: CPT

## 2019-07-11 PROCEDURE — 94761 N-INVAS EAR/PLS OXIMETRY MLT: CPT

## 2019-07-11 PROCEDURE — 84132 ASSAY OF SERUM POTASSIUM: CPT

## 2019-07-11 PROCEDURE — 2500000003 HC RX 250 WO HCPCS: Performed by: NURSE PRACTITIONER

## 2019-07-11 PROCEDURE — 2580000003 HC RX 258: Performed by: FAMILY MEDICINE

## 2019-07-11 PROCEDURE — C9113 INJ PANTOPRAZOLE SODIUM, VIA: HCPCS | Performed by: NURSE PRACTITIONER

## 2019-07-11 PROCEDURE — 6370000000 HC RX 637 (ALT 250 FOR IP): Performed by: FAMILY MEDICINE

## 2019-07-11 PROCEDURE — 85025 COMPLETE CBC W/AUTO DIFF WBC: CPT

## 2019-07-11 PROCEDURE — 6360000002 HC RX W HCPCS: Performed by: NURSE PRACTITIONER

## 2019-07-11 PROCEDURE — 94664 DEMO&/EVAL PT USE INHALER: CPT

## 2019-07-11 PROCEDURE — 84484 ASSAY OF TROPONIN QUANT: CPT

## 2019-07-11 PROCEDURE — 83735 ASSAY OF MAGNESIUM: CPT

## 2019-07-11 PROCEDURE — 82150 ASSAY OF AMYLASE: CPT

## 2019-07-11 PROCEDURE — 6370000000 HC RX 637 (ALT 250 FOR IP): Performed by: NURSE PRACTITIONER

## 2019-07-11 PROCEDURE — 84100 ASSAY OF PHOSPHORUS: CPT

## 2019-07-11 PROCEDURE — 80053 COMPREHEN METABOLIC PANEL: CPT

## 2019-07-11 PROCEDURE — 2500000003 HC RX 250 WO HCPCS: Performed by: FAMILY MEDICINE

## 2019-07-11 RX ORDER — HYDROCODONE BITARTRATE AND ACETAMINOPHEN 5; 325 MG/1; MG/1
1 TABLET ORAL EVERY 6 HOURS PRN
Status: DISCONTINUED | OUTPATIENT
Start: 2019-07-11 | End: 2019-07-12 | Stop reason: HOSPADM

## 2019-07-11 RX ORDER — TRAMADOL HYDROCHLORIDE 50 MG/1
50 TABLET ORAL EVERY 6 HOURS PRN
Status: DISCONTINUED | OUTPATIENT
Start: 2019-07-11 | End: 2019-07-12 | Stop reason: HOSPADM

## 2019-07-11 RX ADMIN — IPRATROPIUM BROMIDE AND ALBUTEROL SULFATE 1 AMPULE: .5; 3 SOLUTION RESPIRATORY (INHALATION) at 08:38

## 2019-07-11 RX ADMIN — Medication 125 MG: at 12:11

## 2019-07-11 RX ADMIN — SODIUM CHLORIDE: 9 INJECTION, SOLUTION INTRAVENOUS at 00:26

## 2019-07-11 RX ADMIN — CARVEDILOL 3.12 MG: 3.12 TABLET, FILM COATED ORAL at 09:21

## 2019-07-11 RX ADMIN — POTASSIUM PHOSPHATE, MONOBASIC AND POTASSIUM PHOSPHATE, DIBASIC 10 MMOL: 224; 236 INJECTION, SOLUTION, CONCENTRATE INTRAVENOUS at 10:42

## 2019-07-11 RX ADMIN — ENOXAPARIN SODIUM 40 MG: 40 INJECTION SUBCUTANEOUS at 09:22

## 2019-07-11 RX ADMIN — HYDROCODONE BITARTRATE AND ACETAMINOPHEN 1 TABLET: 5; 325 TABLET ORAL at 10:42

## 2019-07-11 RX ADMIN — TRAZODONE HYDROCHLORIDE 50 MG: 50 TABLET ORAL at 21:05

## 2019-07-11 RX ADMIN — LISINOPRIL 2.5 MG: 2.5 TABLET ORAL at 09:21

## 2019-07-11 RX ADMIN — GUAIFENESIN 600 MG: 600 TABLET, EXTENDED RELEASE ORAL at 21:05

## 2019-07-11 RX ADMIN — GUAIFENESIN 600 MG: 600 TABLET, EXTENDED RELEASE ORAL at 09:22

## 2019-07-11 RX ADMIN — ASPIRIN 81 MG: 81 TABLET, COATED ORAL at 09:22

## 2019-07-11 RX ADMIN — SPIRONOLACTONE 12.5 MG: 25 TABLET ORAL at 09:21

## 2019-07-11 RX ADMIN — Medication 125 MG: at 23:27

## 2019-07-11 RX ADMIN — CETIRIZINE HYDROCHLORIDE 5 MG: 10 TABLET, FILM COATED ORAL at 09:22

## 2019-07-11 RX ADMIN — HYDROCODONE BITARTRATE AND ACETAMINOPHEN 1 TABLET: 5; 325 TABLET ORAL at 16:55

## 2019-07-11 RX ADMIN — FLUOXETINE 20 MG: 20 CAPSULE ORAL at 09:22

## 2019-07-11 RX ADMIN — Medication 125 MG: at 18:35

## 2019-07-11 RX ADMIN — CARVEDILOL 3.12 MG: 3.12 TABLET, FILM COATED ORAL at 16:55

## 2019-07-11 RX ADMIN — FOLIC ACID: 5 INJECTION, SOLUTION INTRAMUSCULAR; INTRAVENOUS; SUBCUTANEOUS at 16:49

## 2019-07-11 RX ADMIN — PANTOPRAZOLE SODIUM 40 MG: 40 INJECTION, POWDER, FOR SOLUTION INTRAVENOUS at 09:23

## 2019-07-11 ASSESSMENT — PAIN SCALES - GENERAL
PAINLEVEL_OUTOF10: 7
PAINLEVEL_OUTOF10: 7

## 2019-07-11 NOTE — PLAN OF CARE
Problem: Pain:  Description  Pain management should include both nonpharmacologic and pharmacologic interventions.   Goal: Pain level will decrease  Description  Pain level will decrease  Outcome: Ongoing  Goal: Control of acute pain  Description  Control of acute pain  Outcome: Ongoing  Goal: Control of chronic pain  Description  Control of chronic pain  Outcome: Ongoing     Problem: Discharge Planning:  Goal: Discharged to appropriate level of care  Description  Discharged to appropriate level of care  Outcome: Ongoing     Problem: Fluid Volume - Deficit:  Goal: Absence of fluid volume deficit signs and symptoms  Description  Absence of fluid volume deficit signs and symptoms  Outcome: Ongoing     Problem: Nutrition Deficit:  Goal: Ability to achieve adequate nutritional intake will improve  Description  Ability to achieve adequate nutritional intake will improve  Outcome: Ongoing     Problem: Sleep Pattern Disturbance:  Goal: Appears well-rested  Description  Appears well-rested  Outcome: Ongoing     Problem: Violence - Risk of, Self/Other-Directed:  Goal: Knowledge of developmental care interventions  Description  Absence of violence  Outcome: Ongoing     Problem: Diarrhea:  Goal: Bowel elimination is within specified parameters  Description  Bowel elimination is within specified parameters  Outcome: Ongoing  Goal: Passage of soft, formed stool  Description  Passage of soft, formed stool  Outcome: Ongoing  Goal: Establishment of normal bowel function will improve to within specified parameters  Description  Establishment of normal bowel function will improve to within specified parameters  Outcome: Ongoing

## 2019-07-11 NOTE — PROGRESS NOTES
Hospitalist Progress Note      Name:  Royal Nance /Age/Sex: 1965  (48 y.o. male)   MRN & CSN:  [id number] & [id number] Admission Date/Time: 7/10/2019 10:22 AM   [facility]  [facility] PCP: SAIRA Shaw - PILI       Royal Nance is a 48 y.o.  male  who presents with Emesis (2 days, last drank alcohol yesterday) and Back Pain (worse with movement, middle back, radiating up to shoulders and down to knees)      Assessment and Plan:   Hypokalemia  - replaced    HypoMagnesemia  - replace    Hypophosphatemia  - replace    C. Diff  - start oral Vanc QID x 10 days    ETOH Abuse  - not in acute withdrawal  -ciwa prn  - banana bag IVF    Pneumonitis?  - noted on CT scan  - no concerns for aspiration PNA at this time    Low Back Pain  - norco prn    HTN  Tobacco Abuse            Diet DIET CLEAR LIQUID;   Code Status Full Code     Medications:   Medications:    potassium phosphate IVPB  10 mmol Intravenous Once    vancomycin  125 mg Oral 4 times per day    sodium chloride flush  10 mL Intravenous 2 times per day    enoxaparin  40 mg Subcutaneous Daily    pantoprazole  40 mg Intravenous Daily    sodium chloride flush  10 mL Intravenous 2 times per day    folic acid, thiamine, multi-vitamin with vitamin K infusion   Intravenous Daily    carvedilol  3.125 mg Oral BID WC    aspirin  81 mg Oral Daily    FLUoxetine  20 mg Oral Daily    fluticasone  1 spray Nasal Daily    guaiFENesin  600 mg Oral BID    lisinopril  2.5 mg Oral Daily    spironolactone  12.5 mg Oral Daily    traZODone  50 mg Oral Nightly    cetirizine  5 mg Oral Daily    ipratropium-albuterol  1 ampule Inhalation Q4H WA      Infusions:    sodium chloride 100 mL/hr at 19 0026     PRN Meds:   potassium chloride 40 mEq PRN   Or     potassium alternative oral replacement 40 mEq PRN   Or     potassium chloride 10 mEq PRN   sodium chloride flush 10 mL PRN   ondansetron 4 mg Q6H PRN   acetaminophen 650 mg Q4H PRN   senna 1 tablet Daily PRN   nicotine polacrilex 2 mg Q2H PRN   promethazine 25 mg Q6H PRN   sodium chloride flush 10 mL PRN   LORazepam 1 mg Q1H PRN   Or     LORazepam 1 mg Q1H PRN   Or     LORazepam 2 mg Q1H PRN   Or     LORazepam 2 mg Q1H PRN   Or     LORazepam 3 mg Q1H PRN   Or     LORazepam 3 mg Q1H PRN   Or     LORazepam 4 mg Q1H PRN   Or     LORazepam 4 mg Q1H PRN   magnesium sulfate 1 g PRN     Subjective:     Feels better today  Objective: Intake/Output Summary (Last 24 hours) at 7/11/2019 0924  Last data filed at 7/11/2019 0518  Gross per 24 hour   Intake 2070 ml   Output --   Net 2070 ml      Vitals:   Vitals:    07/11/19 0838   BP:    Pulse:    Resp: 16   Temp:    SpO2:      Physical Exam:   Gen:  awake, alert, cooperative, no apparent distress  Head/Eyes:  Normocephalic atraumatic, EOMI   NECK:   symmetrical, trachea midline  LUNGS: Normal Effort   CARDIOVASCULAR:  Normal rate  ABDOMEN: Non tender, non distended, no HSM noted. MUSCULOSKELETAL:  ROM limited  NEUROLOGIC: Alert and Oriented,  Cranial nerves II-XII are grossly intact.    SKIN:  no bruising or bleeding, normal skin color,  no redness      Data:       CBC   Recent Labs     07/10/19  1013 07/11/19  0509   WBC 10.5 5.6   HGB 14.1 12.3*   HCT 39.2* 36.0*    213      BMP Recent Labs     07/10/19  1013 07/11/19  0030 07/11/19  0509   *  --  138   K 2.7  K CALLED TO DR SCHULZ @ 1121 [id number] BY GAMAL DAUGHERTYT  RESULTS READ BACK  * 3.7 3.6   CL 84*  --  98*   CO2 35*  --  32   PHOS  --   --  2.4*   BUN 9  --  7   CREATININE 0.8*  --  0.5*         Electronically signed by Gary Lenz MD on 7/11/2019 at 9:24 AM

## 2019-07-11 NOTE — PROGRESS NOTES
Nutrition Assessment    Type and Reason for Visit: Initial, Positive Nutrition Screen(wt loss, poor intake)    Nutrition Recommendations:   · Continue General Diet  · Begin standard high tonio oral nutrition supplement bid, between meals    Nutrition Assessment: Moderate nutrition risk with poor intake/appetite PTA 2/2 N/V/alcohol abuse. No wt loss noted over past 1.5 yrs per Epic history. No intake recorded yet on recently advanced General Diet. C diff + noted. Malnutrition Assessment:  · Malnutrition Status: At risk for malnutrition    Nutrition Risk Level:  Moderate    Nutrient Needs:  · Estimated Daily Total Kcal: 1349-7611 (Glades-St Jeor)  · Estimated Daily Protein (g):  (1.3-2 g/kg IBW)  · Estimated Daily Total Fluid (ml/day): 4397-0015 (1 ml/kcal)    Nutrition Diagnosis:   · Problem: Inadequate oral intake  · Etiology: related to Alteration in GI function, Psychological cause/life stress     Signs and symptoms:  as evidenced by Diet history of poor intake, Nausea, Vomiting, Diarrhea    Objective Information:  · Wound Type: None  · Current Nutrition Therapies:  · Oral Diet Orders: General   · Oral Diet intake: Unable to assess  · Oral Nutrition Supplement (ONS) Orders: None  · Anthropometric Measures:  · Ht: 5' 6\" (167.6 cm)   · Current Body Wt: 174 lb (78.9 kg)  · Usual Body Wt: 178 lb (80.7 kg)(12/13/17)  · % Weight Change:  no recent loss noted  · Ideal Body Wt: 142 lb (64.4 kg), % Ideal Body 122  · BMI Classification: BMI 25.0 - 29.9 Overweight(28.1)    Nutrition Interventions:   Continue current diet, Start ONS, Mineral Supplement, Vitamin Supplement  Continued Inpatient Monitoring, Education not appropriate at this time, Coordination of Care    Nutrition Evaluation:   · Evaluation: Goals set   · Goals:   Patient will meet at least 75% of estimated nutrient needs during los with meals and supplements provided    · Monitoring: Meal Intake, Supplement Intake, Diet Tolerance, Weight, Pertinent Labs, Nausea or Vomiting, Diarrhea      Electronically signed by Kojo Daugherty RD, LD on 7/11/19 at 10:39 AM    Contact Number: [phone]

## 2019-07-12 VITALS
TEMPERATURE: 98.2 F | HEART RATE: 85 BPM | HEIGHT: 66 IN | OXYGEN SATURATION: 90 % | DIASTOLIC BLOOD PRESSURE: 76 MMHG | RESPIRATION RATE: 15 BRPM | SYSTOLIC BLOOD PRESSURE: 127 MMHG | BODY MASS INDEX: 27.97 KG/M2 | WEIGHT: 174 LBS

## 2019-07-12 LAB
ANION GAP SERPL CALCULATED.3IONS-SCNC: 7 MMOL/L (ref 4–16)
ANISOCYTOSIS: ABNORMAL
BASOPHILS ABSOLUTE: 0.1 K/CU MM
BASOPHILS RELATIVE PERCENT: 0.7 % (ref 0–1)
BUN BLDV-MCNC: 7 MG/DL (ref 6–23)
CALCIUM SERPL-MCNC: 7.7 MG/DL (ref 8.3–10.6)
CHLORIDE BLD-SCNC: 99 MMOL/L (ref 99–110)
CO2: 33 MMOL/L (ref 21–32)
CREAT SERPL-MCNC: 0.7 MG/DL (ref 0.9–1.3)
DIFFERENTIAL TYPE: ABNORMAL
EOSINOPHILS ABSOLUTE: 0.2 K/CU MM
EOSINOPHILS RELATIVE PERCENT: 2.9 % (ref 0–3)
GFR AFRICAN AMERICAN: >60 ML/MIN/1.73M2
GFR NON-AFRICAN AMERICAN: >60 ML/MIN/1.73M2
GLUCOSE BLD-MCNC: 93 MG/DL (ref 70–99)
HCT VFR BLD CALC: 36.3 % (ref 42–52)
HEMOGLOBIN: 12.5 GM/DL (ref 13.5–18)
IMMATURE NEUTROPHIL %: 0.6 % (ref 0–0.43)
LYMPHOCYTES ABSOLUTE: 1.4 K/CU MM
LYMPHOCYTES RELATIVE PERCENT: 20 % (ref 24–44)
MAGNESIUM: 1.7 MG/DL (ref 1.8–2.4)
MCH RBC QN AUTO: 38.2 PG (ref 27–31)
MCHC RBC AUTO-ENTMCNC: 34.4 % (ref 32–36)
MCV RBC AUTO: 111 FL (ref 78–100)
MONOCYTES ABSOLUTE: 0.5 K/CU MM
MONOCYTES RELATIVE PERCENT: 7 % (ref 0–4)
NUCLEATED RBC %: 0 %
PDW BLD-RTO: 15.8 % (ref 11.7–14.9)
PLATELET # BLD: 235 K/CU MM (ref 140–440)
PLT MORPHOLOGY: ABNORMAL
PMV BLD AUTO: 9.4 FL (ref 7.5–11.1)
POLYCHROMASIA: ABNORMAL
POTASSIUM SERPL-SCNC: 3.4 MMOL/L (ref 3.5–5.1)
RBC # BLD: 3.27 M/CU MM (ref 4.6–6.2)
SEGMENTED NEUTROPHILS ABSOLUTE COUNT: 5 K/CU MM
SEGMENTED NEUTROPHILS RELATIVE PERCENT: 68.8 % (ref 36–66)
SODIUM BLD-SCNC: 139 MMOL/L (ref 135–145)
TOTAL IMMATURE NEUTOROPHIL: 0.04 K/CU MM
TOTAL NUCLEATED RBC: 0 K/CU MM
WBC # BLD: 7.2 K/CU MM (ref 4–10.5)

## 2019-07-12 PROCEDURE — 96366 THER/PROPH/DIAG IV INF ADDON: CPT

## 2019-07-12 PROCEDURE — 6360000002 HC RX W HCPCS: Performed by: FAMILY MEDICINE

## 2019-07-12 PROCEDURE — 6370000000 HC RX 637 (ALT 250 FOR IP): Performed by: FAMILY MEDICINE

## 2019-07-12 PROCEDURE — G0378 HOSPITAL OBSERVATION PER HR: HCPCS

## 2019-07-12 PROCEDURE — 2580000003 HC RX 258: Performed by: NURSE PRACTITIONER

## 2019-07-12 PROCEDURE — 80048 BASIC METABOLIC PNL TOTAL CA: CPT

## 2019-07-12 PROCEDURE — 96376 TX/PRO/DX INJ SAME DRUG ADON: CPT

## 2019-07-12 PROCEDURE — 6360000002 HC RX W HCPCS: Performed by: NURSE PRACTITIONER

## 2019-07-12 PROCEDURE — 96372 THER/PROPH/DIAG INJ SC/IM: CPT

## 2019-07-12 PROCEDURE — C9113 INJ PANTOPRAZOLE SODIUM, VIA: HCPCS | Performed by: NURSE PRACTITIONER

## 2019-07-12 PROCEDURE — 83735 ASSAY OF MAGNESIUM: CPT

## 2019-07-12 PROCEDURE — 6370000000 HC RX 637 (ALT 250 FOR IP): Performed by: NURSE PRACTITIONER

## 2019-07-12 PROCEDURE — 85025 COMPLETE CBC W/AUTO DIFF WBC: CPT

## 2019-07-12 RX ORDER — POTASSIUM CHLORIDE 20 MEQ/1
40 TABLET, EXTENDED RELEASE ORAL ONCE
Status: COMPLETED | OUTPATIENT
Start: 2019-07-12 | End: 2019-07-12

## 2019-07-12 RX ORDER — MAGNESIUM SULFATE 1 G/100ML
1 INJECTION INTRAVENOUS ONCE
Status: COMPLETED | OUTPATIENT
Start: 2019-07-12 | End: 2019-07-12

## 2019-07-12 RX ORDER — HYDROCODONE BITARTRATE AND ACETAMINOPHEN 5; 325 MG/1; MG/1
1 TABLET ORAL EVERY 6 HOURS PRN
Qty: 10 TABLET | Refills: 0 | Status: SHIPPED | OUTPATIENT
Start: 2019-07-12 | End: 2019-07-15

## 2019-07-12 RX ORDER — POTASSIUM CHLORIDE 750 MG/1
10 TABLET, EXTENDED RELEASE ORAL 2 TIMES DAILY
Qty: 60 TABLET | Refills: 5 | Status: SHIPPED | OUTPATIENT
Start: 2019-07-12

## 2019-07-12 RX ADMIN — CETIRIZINE HYDROCHLORIDE 5 MG: 10 TABLET, FILM COATED ORAL at 08:18

## 2019-07-12 RX ADMIN — LISINOPRIL 2.5 MG: 2.5 TABLET ORAL at 08:18

## 2019-07-12 RX ADMIN — MAGNESIUM SULFATE HEPTAHYDRATE 1 G: 1 INJECTION, SOLUTION INTRAVENOUS at 08:17

## 2019-07-12 RX ADMIN — HYDROCODONE BITARTRATE AND ACETAMINOPHEN 1 TABLET: 5; 325 TABLET ORAL at 08:18

## 2019-07-12 RX ADMIN — CARVEDILOL 3.12 MG: 3.12 TABLET, FILM COATED ORAL at 08:18

## 2019-07-12 RX ADMIN — TRAMADOL HYDROCHLORIDE 50 MG: 50 TABLET, FILM COATED ORAL at 04:49

## 2019-07-12 RX ADMIN — POTASSIUM CHLORIDE 40 MEQ: 20 TABLET, EXTENDED RELEASE ORAL at 08:18

## 2019-07-12 RX ADMIN — Medication 125 MG: at 05:33

## 2019-07-12 RX ADMIN — GUAIFENESIN 600 MG: 600 TABLET, EXTENDED RELEASE ORAL at 08:18

## 2019-07-12 RX ADMIN — Medication 10 ML: at 08:17

## 2019-07-12 RX ADMIN — ASPIRIN 81 MG: 81 TABLET, COATED ORAL at 08:18

## 2019-07-12 RX ADMIN — ENOXAPARIN SODIUM 40 MG: 40 INJECTION SUBCUTANEOUS at 08:17

## 2019-07-12 RX ADMIN — SPIRONOLACTONE 12.5 MG: 25 TABLET ORAL at 08:18

## 2019-07-12 RX ADMIN — PANTOPRAZOLE SODIUM 40 MG: 40 INJECTION, POWDER, FOR SOLUTION INTRAVENOUS at 08:17

## 2019-07-12 RX ADMIN — FLUTICASONE PROPIONATE 1 SPRAY: 50 SPRAY, METERED NASAL at 09:54

## 2019-07-12 RX ADMIN — SODIUM CHLORIDE, PRESERVATIVE FREE 10 ML: 5 INJECTION INTRAVENOUS at 09:53

## 2019-07-12 RX ADMIN — FLUOXETINE 20 MG: 20 CAPSULE ORAL at 08:18

## 2019-07-12 ASSESSMENT — PAIN SCALES - GENERAL
PAINLEVEL_OUTOF10: 6
PAINLEVEL_OUTOF10: 8
PAINLEVEL_OUTOF10: 9

## 2019-07-12 NOTE — DISCHARGE SUMMARY
Medical/Surgical History: NONE FINDINGS: BONES/ALIGNMENT: There is normal alignment of the spine. The vertebral body heights are maintained. No osseous destructive lesion is seen. No acute fracture is identified. DEGENERATIVE CHANGES: Diffuse mild to moderate degenerative changes identified within the lumbar spine. At L1-L2 the central canal and neural foramina are patent. At L2-L3 annular disc bulge, facet arthropathy and ligamentum flavum hypertrophy results in mild central canal stenosis. Both foramina are moderately stenotic. At L3-L4 moderate central canal stenosis identified due to annular bulge, facet arthropathy and ligamentum flavum hypertrophy. Both foramina are moderately stenotic. At L4-L5 severe bilateral foraminal stenosis is appreciated. Left paracentral osteophyte deforms the thecal sac. Central canal is mildly stenotic. At L5-S1 severe right foraminal stenosis and moderate left foraminal stenosis are identified. Shallow disc bulge flattens the thecal sac. SOFT TISSUES/RETROPERITONEUM: No paraspinal mass is seen. No evidence of acute fracture. Moderate central canal stenosis and moderate bilateral foraminal stenosis at L3-L4. Mild central canal stenosis at L2-L3 and L4-L5. Moderate severe bilateral foraminal stenosis is noted. Moderate to severe bilateral foraminal stenosis at L5-S1 greater on the right. Ct Abdomen Pelvis W Iv Contrast    Result Date: 7/10/2019  EXAMINATION: CT OF THE ABDOMEN AND PELVIS WITH CONTRAST 7/10/2019 11:48 am TECHNIQUE: CT of the abdomen and pelvis was performed with the administration of intravenous contrast. Multiplanar reformatted images are provided for review. Dose modulation, iterative reconstruction, and/or weight based adjustment of the mA/kV was utilized to reduce the radiation dose to as low as reasonably achievable. COMPARISON: 05/13/2017.  HISTORY: ORDERING SYSTEM PROVIDED HISTORY: epigastric pain, nausea, vomiting TECHNOLOGIST PROVIDED HISTORY: IV contrast only. Thank you. Reason for Exam: epigastric pain, nausea, vomiting Acuity: Acute Type of Exam: Initial Additional signs and symptoms: NONE Relevant Medical/Surgical History: 75ML SIOVUE 370/ GFR>60 FINDINGS: Lower Chest: The lung bases demonstrate patchy areas of ground-glass opacity. Organs: The liver, spleen, gallbladder, pancreas and adrenal glands appear unremarkable. There is symmetric enhancement of the kidneys. No hydronephrosis is seen. GI/Bowel: Evaluation of the bowel is limited as no enteric contrast was given. No dilated loops of bowel are seen. The appendix is not dilated. Pelvis: No pelvic masses or fluid collections are seen. Peritoneum/Retroperitoneum: The abdominal aorta is mildly aneurysmal measuring 2.8 x 2.8 cm. No retroperitoneal or mesenteric lymphadenopathy is seen. Bones/Soft Tissues: There are degenerative changes involving the spine and right hip. No acute bony abnormalities are noted. There is a fluid collections seen adjacent to the right hip measuring 3.8 x 1.9 cm.     1. Patchy areas of ground-glass opacity involving the lung bases likely representing pneumonitis. 2. No acute intra-abdominal abnormality. 3. No acute intrapelvic abnormality. 4. Mild aneurysmal dilatation of the abdominal aorta measuring 2.8 cm. 5. Right hip osteoarthritis with adjacent fluid collection likely representing fluid within the bursa. RECOMMENDATIONS: Managing Abdominal Aortic Aneurysms 2.6-2.9 cm: Every 5 years* 3.0-3.4 cm: Every 3 years. 3.5-3.9 cm: Every 1 year. 4.0-4.4 cm: Every 1 year. Recommend vascular consultation. 4.5-5.4 cm: Every 6 months. Recommend vascular consultation. Greater than or equal to 5.5 cm: Referral to vascular surgeon. *For abdominal aortas with maximum diameter of 2.6-2.9 cm meeting criteria for AAA (>50% of proximal normal segment). Reference: J Vasc Surg.  2009 Oct;50(4 Suppl):S2-49       Significant Diagnostic Studies at discharge:   CBC:   Lab Results   Component

## 2019-07-16 LAB
EKG ATRIAL RATE: 81 BPM
EKG DIAGNOSIS: NORMAL
EKG P AXIS: 56 DEGREES
EKG P-R INTERVAL: 162 MS
EKG Q-T INTERVAL: 522 MS
EKG QRS DURATION: 96 MS
EKG QTC CALCULATION (BAZETT): 606 MS
EKG R AXIS: -32 DEGREES
EKG T AXIS: 80 DEGREES
EKG VENTRICULAR RATE: 81 BPM

## 2019-07-22 ENCOUNTER — APPOINTMENT (OUTPATIENT)
Dept: CT IMAGING | Age: 54
End: 2019-07-22
Payer: COMMERCIAL

## 2019-07-22 ENCOUNTER — HOSPITAL ENCOUNTER (EMERGENCY)
Age: 54
Discharge: PSYCHIATRIC OR SUBSTANCE USE FACILITY | End: 2019-07-23
Attending: EMERGENCY MEDICINE
Payer: COMMERCIAL

## 2019-07-22 DIAGNOSIS — R45.89 SUICIDAL BEHAVIOR WITHOUT ATTEMPTED SELF-INJURY: Primary | ICD-10-CM

## 2019-07-22 DIAGNOSIS — F10.920 ACUTE ALCOHOLIC INTOXICATION WITHOUT COMPLICATION (HCC): ICD-10-CM

## 2019-07-22 LAB
ACETAMINOPHEN LEVEL: <5 UG/ML (ref 15–30)
ALBUMIN SERPL-MCNC: 2.9 GM/DL (ref 3.4–5)
ALCOHOL SCREEN SERUM: 0.33 %WT/VOL
ALP BLD-CCNC: 85 IU/L (ref 40–128)
ALT SERPL-CCNC: 15 U/L (ref 10–40)
AMPHETAMINES: NEGATIVE
ANION GAP SERPL CALCULATED.3IONS-SCNC: 11 MMOL/L (ref 4–16)
AST SERPL-CCNC: 40 IU/L (ref 15–37)
BACTERIA: NEGATIVE /HPF
BARBITURATE SCREEN URINE: NEGATIVE
BASOPHILS ABSOLUTE: 0.1 K/CU MM
BASOPHILS RELATIVE PERCENT: 1.4 % (ref 0–1)
BENZODIAZEPINE SCREEN, URINE: NEGATIVE
BILIRUB SERPL-MCNC: 0.2 MG/DL (ref 0–1)
BILIRUBIN URINE: NEGATIVE MG/DL
BLOOD, URINE: NEGATIVE
BUN BLDV-MCNC: 5 MG/DL (ref 6–23)
CALCIUM SERPL-MCNC: 8.1 MG/DL (ref 8.3–10.6)
CANNABINOID SCREEN URINE: NEGATIVE
CHLORIDE BLD-SCNC: 101 MMOL/L (ref 99–110)
CLARITY: CLEAR
CO2: 33 MMOL/L (ref 21–32)
COCAINE METABOLITE: NEGATIVE
COLOR: ABNORMAL
CREAT SERPL-MCNC: 0.7 MG/DL (ref 0.9–1.3)
DIFFERENTIAL TYPE: ABNORMAL
DOSE AMOUNT: ABNORMAL
DOSE AMOUNT: ABNORMAL
DOSE TIME: ABNORMAL
DOSE TIME: ABNORMAL
EOSINOPHILS ABSOLUTE: 0.1 K/CU MM
EOSINOPHILS RELATIVE PERCENT: 1.6 % (ref 0–3)
GFR AFRICAN AMERICAN: >60 ML/MIN/1.73M2
GFR NON-AFRICAN AMERICAN: >60 ML/MIN/1.73M2
GLUCOSE BLD-MCNC: 109 MG/DL (ref 70–99)
GLUCOSE, URINE: NEGATIVE MG/DL
HCT VFR BLD CALC: 36.2 % (ref 42–52)
HEMOGLOBIN: 12.6 GM/DL (ref 13.5–18)
IMMATURE NEUTROPHIL %: 0.3 % (ref 0–0.43)
KETONES, URINE: NEGATIVE MG/DL
LEUKOCYTE ESTERASE, URINE: NEGATIVE
LYMPHOCYTES ABSOLUTE: 2 K/CU MM
LYMPHOCYTES RELATIVE PERCENT: 29.5 % (ref 24–44)
MCH RBC QN AUTO: 38.1 PG (ref 27–31)
MCHC RBC AUTO-ENTMCNC: 34.8 % (ref 32–36)
MCV RBC AUTO: 109.4 FL (ref 78–100)
MONOCYTES ABSOLUTE: 0.8 K/CU MM
MONOCYTES RELATIVE PERCENT: 11.1 % (ref 0–4)
MUCUS: ABNORMAL HPF
NITRITE URINE, QUANTITATIVE: NEGATIVE
NUCLEATED RBC %: 0 %
OPIATES, URINE: NEGATIVE
OXYCODONE: NORMAL
PDW BLD-RTO: 16.4 % (ref 11.7–14.9)
PH, URINE: 6 (ref 5–8)
PHENCYCLIDINE, URINE: NEGATIVE
PLATELET # BLD: 385 K/CU MM (ref 140–440)
PMV BLD AUTO: 8.6 FL (ref 7.5–11.1)
POTASSIUM SERPL-SCNC: 3.2 MMOL/L (ref 3.5–5.1)
PROTEIN UA: NEGATIVE MG/DL
RBC # BLD: 3.31 M/CU MM (ref 4.6–6.2)
RBC URINE: ABNORMAL /HPF (ref 0–3)
SALICYLATE LEVEL: <0.3 MG/DL (ref 15–30)
SEGMENTED NEUTROPHILS ABSOLUTE COUNT: 3.9 K/CU MM
SEGMENTED NEUTROPHILS RELATIVE PERCENT: 56.1 % (ref 36–66)
SODIUM BLD-SCNC: 145 MMOL/L (ref 135–145)
SPECIFIC GRAVITY UA: 1 (ref 1–1.03)
TOTAL IMMATURE NEUTOROPHIL: 0.02 K/CU MM
TOTAL NUCLEATED RBC: 0 K/CU MM
TOTAL PROTEIN: 5.3 GM/DL (ref 6.4–8.2)
TRICHOMONAS: ABNORMAL /HPF
UROBILINOGEN, URINE: NORMAL MG/DL (ref 0.2–1)
WBC # BLD: 6.9 K/CU MM (ref 4–10.5)
WBC UA: ABNORMAL /HPF (ref 0–2)

## 2019-07-22 PROCEDURE — 99285 EMERGENCY DEPT VISIT HI MDM: CPT

## 2019-07-22 PROCEDURE — 80053 COMPREHEN METABOLIC PANEL: CPT

## 2019-07-22 PROCEDURE — 36415 COLL VENOUS BLD VENIPUNCTURE: CPT

## 2019-07-22 PROCEDURE — G0480 DRUG TEST DEF 1-7 CLASSES: HCPCS

## 2019-07-22 PROCEDURE — 85025 COMPLETE CBC W/AUTO DIFF WBC: CPT

## 2019-07-22 PROCEDURE — 81001 URINALYSIS AUTO W/SCOPE: CPT

## 2019-07-22 PROCEDURE — 70450 CT HEAD/BRAIN W/O DYE: CPT

## 2019-07-22 PROCEDURE — 80307 DRUG TEST PRSMV CHEM ANLYZR: CPT

## 2019-07-22 PROCEDURE — 6370000000 HC RX 637 (ALT 250 FOR IP): Performed by: EMERGENCY MEDICINE

## 2019-07-22 PROCEDURE — 70486 CT MAXILLOFACIAL W/O DYE: CPT

## 2019-07-22 RX ORDER — IBUPROFEN 600 MG/1
600 TABLET ORAL ONCE
Status: COMPLETED | OUTPATIENT
Start: 2019-07-22 | End: 2019-07-22

## 2019-07-22 RX ORDER — POTASSIUM CHLORIDE 20 MEQ/1
40 TABLET, EXTENDED RELEASE ORAL ONCE
Status: COMPLETED | OUTPATIENT
Start: 2019-07-22 | End: 2019-07-23

## 2019-07-22 RX ADMIN — IBUPROFEN 600 MG: 600 TABLET, FILM COATED ORAL at 21:13

## 2019-07-22 ASSESSMENT — PAIN SCALES - GENERAL
PAINLEVEL_OUTOF10: 9
PAINLEVEL_OUTOF10: 5

## 2019-07-22 ASSESSMENT — PAIN DESCRIPTION - LOCATION: LOCATION: GENERALIZED

## 2019-07-22 ASSESSMENT — PAIN DESCRIPTION - PAIN TYPE: TYPE: CHRONIC PAIN

## 2019-07-23 VITALS
TEMPERATURE: 98.6 F | WEIGHT: 173 LBS | OXYGEN SATURATION: 94 % | BODY MASS INDEX: 27.8 KG/M2 | HEART RATE: 63 BPM | HEIGHT: 66 IN | DIASTOLIC BLOOD PRESSURE: 93 MMHG | RESPIRATION RATE: 16 BRPM | SYSTOLIC BLOOD PRESSURE: 149 MMHG

## 2019-07-23 LAB
ALCOHOL SCREEN SERUM: 0.07 %WT/VOL
ALCOHOL SCREEN SERUM: 0.13 %WT/VOL
ALCOHOL SCREEN SERUM: 0.17 %WT/VOL

## 2019-07-23 PROCEDURE — 6370000000 HC RX 637 (ALT 250 FOR IP): Performed by: EMERGENCY MEDICINE

## 2019-07-23 PROCEDURE — G0480 DRUG TEST DEF 1-7 CLASSES: HCPCS

## 2019-07-23 PROCEDURE — 36415 COLL VENOUS BLD VENIPUNCTURE: CPT

## 2019-07-23 RX ORDER — VANCOMYCIN HYDROCHLORIDE 125 MG/1
125 CAPSULE ORAL 4 TIMES DAILY
Qty: 40 CAPSULE | Refills: 0 | Status: SHIPPED | OUTPATIENT
Start: 2019-07-23 | End: 2019-08-02

## 2019-07-23 RX ORDER — ACETAMINOPHEN 500 MG
1000 TABLET ORAL ONCE
Status: COMPLETED | OUTPATIENT
Start: 2019-07-23 | End: 2019-07-23

## 2019-07-23 RX ADMIN — POTASSIUM CHLORIDE 40 MEQ: 20 TABLET, EXTENDED RELEASE ORAL at 06:04

## 2019-07-23 RX ADMIN — ACETAMINOPHEN 1000 MG: 500 TABLET ORAL at 11:12

## 2019-07-23 RX ADMIN — Medication 125 MG: at 19:24

## 2019-07-23 ASSESSMENT — PAIN DESCRIPTION - PAIN TYPE: TYPE: CHRONIC PAIN

## 2019-07-23 ASSESSMENT — PAIN DESCRIPTION - PROGRESSION: CLINICAL_PROGRESSION: NOT CHANGED

## 2019-07-23 ASSESSMENT — PAIN DESCRIPTION - LOCATION: LOCATION: BACK

## 2019-07-23 ASSESSMENT — PAIN SCALES - GENERAL
PAINLEVEL_OUTOF10: 7
PAINLEVEL_OUTOF10: 8

## 2019-07-23 ASSESSMENT — PAIN DESCRIPTION - ONSET: ONSET: ON-GOING

## 2019-07-23 ASSESSMENT — PAIN DESCRIPTION - DESCRIPTORS: DESCRIPTORS: DULL;DISCOMFORT;ACHING

## 2019-07-23 ASSESSMENT — PAIN DESCRIPTION - FREQUENCY: FREQUENCY: CONTINUOUS

## 2019-07-23 ASSESSMENT — PAIN DESCRIPTION - ORIENTATION: ORIENTATION: LOWER

## 2019-07-23 NOTE — ED PROVIDER NOTES
Triage Chief Complaint:   Suicidal and Alcohol Intoxication    [place]:  Mell Fletcher is a 48 y.o. male that presents the emergency department with suicidal ideation. Patient reports that he was an argument earlier with his wife and reports that he was going to hurt himself there was a handgun present at the scene that the wife had pulled away from him. Patient had endorsed alcohol intake today. No psychiatric admissions in the past.  Patient not clear whether he has a plan at this time. He reports that he had a mechanical fall 2 days ago hitting the right side of his face against a table. Denying any visual changes, denies any numbness tingling or any focal weakness. ROS:  General:  No fevers  Eyes:  No recent vison changes  ENT:  No sore throat, no nasal congestion  Cardiovascular:  No chest pain, no palpitations  Respiratory:  No shortness of breath  Gastrointestinal:  No pain, no nausea, no vomiting, no diarrhea  Musculoskeletal:  +muscle pain  Skin:  No rash  Neurologic:  no headache  Psychiatric:  + SI  Genitourinary:  No dysuria  Endocrine:  No unexpected weight gain, no unexpected weight loss  Extremities:  no edema, no pain    Past Medical History:   Diagnosis Date    Abnormal stress test 05/2017    per old chart stress test showed severe LV dysfunction and EF of 27%    Anxiety     CAD (coronary artery disease)     Chest pain     per old chart pt came in ER 5/13/2017 ( had cath done 5/12/2017) with c/o chest pain, SOB( noted on discharge notes ? NSTEMI with this admission)    Diverticulosis     hx per old chart\"told I had this in April 2017- put me on antibiotics for this\"    History of kidney stones     \"had kidney stone I passed on my own yrs ago\"    HX OTHER MEDICAL     \" I should tell you, I have high pain tolerance- I am an ex- [employer]\"    Hyperlipidemia     Hypertension     MI (myocardial infarction) ([facility] [address]     \"\"they told me I had a massive heart attack on 5/9/2017\"    OCD NEGATIVE          THRESHOLD CONCENTRATIONS (mg/dL)  AMPHT               1000  YULIYA,OPIA             300  BZO,BAR              200  PCP                   25  THC                   50  OXY                  100          IF POSITIVE, SPECIMEN WILL BE  DISCARDED AFTER 6 MONTHS. CALL LAB IF CONFIRMATION NEEDED. ALL NEGATIVE SPECIMENS WILL BE  DISCARDED AFTER ONE WEEK. * UNCONFIRMED POSITIVES MAY  NOT MEET FORENSIC REQUIREMENTS. Ethanol   Result Value Ref Range    Alcohol Scrn 0.17 (H) <0.01 %WT/VOL   ETOH Blood   Result Value Ref Range    Alcohol Scrn 0.13 (H) <0.01 %WT/VOL   ETOH Blood   Result Value Ref Range    Alcohol Scrn 0.07 (H) <0.01 %WT/VOL      Radiographs (if obtained):  [] The following radiograph was interpreted by myself in the absence of a radiologist:   [x] Radiologist's Report Reviewed:  CT MAXILLOFACIAL WO CONTRAST   Final Result   No acute traumatic injury of the facial bones. CT Head WO Contrast   Final Result   No acute intracranial abnormality. Chart review shows recent radiographs:  Ct Lumbar Spine Wo Contrast    Result Date: 7/10/2019  EXAMINATION: CT OF THE LUMBAR SPINE WITHOUT CONTRAST  7/10/2019 TECHNIQUE: CT of the lumbar spine was performed without the administration of intravenous contrast. Multiplanar reformatted images are provided for review. Dose modulation, iterative reconstruction, and/or weight based adjustment of the mA/kV was utilized to reduce the radiation dose to as low as reasonably achievable. COMPARISON: None HISTORY: ORDERING SYSTEM PROVIDED HISTORY: low back pain TECHNOLOGIST PROVIDED HISTORY: Reason for exam:->low back pain Reason for Exam: LOW BACK PAIN, NKI Acuity: Acute Type of Exam: Initial Additional signs and symptoms: NONE Relevant Medical/Surgical History: NONE FINDINGS: BONES/ALIGNMENT: There is normal alignment of the spine. The vertebral body heights are maintained. No osseous destructive lesion is seen.   No acute fracture is

## 2019-07-23 NOTE — ED NOTES
Pt eyes closed, respirations easy, semi fowlers position in bed. Family member in room. Sitter at bedside.       Tiarra Lemon RN  07/23/19 6299

## 2019-07-23 NOTE — ED PROVIDER NOTES
review. Dose modulation, iterative reconstruction, and/or weight based adjustment of the mA/kV was utilized to reduce the radiation dose to as low as reasonably achievable. COMPARISON: None HISTORY: ORDERING SYSTEM PROVIDED HISTORY: right  periorbital injury from fall, etoh use TECHNOLOGIST PROVIDED HISTORY: Reason for Exam: etoh and fall; pt talking during exam; not following commands; extra padding used to combat motion; best images possible Acuity: Acute Type of Exam: Initial Mechanism of Injury: fall Relevant Medical/Surgical History: none FINDINGS: FACIAL BONES:  The maxilla, pterygoid plates and zygomatic arches are intact. The mandible is intact. The mandibular condyles are normally situated. The nasal bones and maxillary nasal processes are intact. ORBITS:  The globes appear intact. The extraocular muscles, optic nerve sheath complexes and lacrimal glands appear unremarkable. No retrobulbar hematoma or mass is seen. The orbital walls and rims are intact. SINUSES/MASTOIDS:  The paranasal sinuses and mastoid air cells are well aerated. No acute fracture is seen. Retention cyst in the left maxillary sinus. SOFT TISSUES:  No appreciable facial soft tissue swelling is seen. No acute traumatic injury of the facial bones. Final ED Course and MDM: In brief, Ayaz Massey is a 48 y.o. male whose care was signed out to me by the outgoing provider. Awaiting repeat ETOH and MH crisis eval.     7AM  I have signed out [facility] Fill Emergency Department care to Dr. Dominic Osei. We discussed the pertinent history, physical exam, completed/pending test results (if applicable) and current treatment plan. Please refer to his/her chart for the patients remaining Emergency Department course and final disposition.       ED Medication Orders (From admission, onward)    Start Ordered     Status Ordering Provider    07/22/19 2246 07/22/19 1911  potassium chloride (KLOR-CON M) extended release tablet 40 mEq  ONCE Ordered RENETTA GARCÍA    07/22/19 2100 07/22/19 2058  ibuprofen (ADVIL;MOTRIN) tablet 600 mg  ONCE      Last MAR action:  Given - by Keo Jackson on 07/22/19 at 2113 [place], [place] [address]          Final Impression      1. Suicidal behavior without attempted self-injury    2.  Acute alcoholic intoxication without complication ([facility] [address]        DISPOSITION       (Please note that portions of this note may have been completed with a voice recognition program. Efforts were made to edit the dictations but occasionally words are mis-transcribed.)    Scottie Cadet MD  [id number] [address], [place]  07/23/19 5889

## 2019-07-23 NOTE — ED NOTES
Pt sitting at end of bed. Alert, calm and cooperative. Warm blanket given to pt. No other needs at this time. Sitter at bedside.      Deloria Area, RN  07/23/19 7812

## 2019-07-23 NOTE — ED NOTES
FRANCINE SALGUERO THERAPIST IN TO SEE [facility] Tatyana Garcia  07/23/19 1396

## 2019-07-24 ENCOUNTER — HOSPITAL ENCOUNTER (OUTPATIENT)
Age: 54
Setting detail: SPECIMEN
Discharge: HOME | End: 2019-07-24
Payer: COMMERCIAL

## 2019-07-24 PROCEDURE — 87324 CLOSTRIDIUM AG IA: CPT

## 2019-07-25 LAB
CLOSTRIDIUM DIFFICILE, PCR: NORMAL
CLOSTRIDIUM DIFFICILE, PCR: NORMAL

## 2025-03-15 ENCOUNTER — APPOINTMENT (OUTPATIENT)
Dept: GENERAL RADIOLOGY | Age: 60
DRG: 308 | End: 2025-03-15
Payer: OTHER GOVERNMENT

## 2025-03-15 ENCOUNTER — APPOINTMENT (OUTPATIENT)
Dept: CT IMAGING | Age: 60
DRG: 308 | End: 2025-03-15
Payer: OTHER GOVERNMENT

## 2025-03-15 ENCOUNTER — HOSPITAL ENCOUNTER (INPATIENT)
Age: 60
LOS: 1 days | Discharge: ANOTHER HOSPITAL | DRG: 308 | End: 2025-03-16
Attending: EMERGENCY MEDICINE | Admitting: SPECIALIST
Payer: OTHER GOVERNMENT

## 2025-03-15 VITALS
HEART RATE: 72 BPM | DIASTOLIC BLOOD PRESSURE: 73 MMHG | TEMPERATURE: 97.7 F | SYSTOLIC BLOOD PRESSURE: 103 MMHG | WEIGHT: 190 LBS | HEIGHT: 66 IN | BODY MASS INDEX: 30.53 KG/M2 | RESPIRATION RATE: 16 BRPM | OXYGEN SATURATION: 99 %

## 2025-03-15 DIAGNOSIS — J96.90 RESPIRATORY FAILURE, UNSPECIFIED CHRONICITY, UNSPECIFIED WHETHER WITH HYPOXIA OR HYPERCAPNIA: ICD-10-CM

## 2025-03-15 DIAGNOSIS — S09.90XA INJURY OF HEAD, INITIAL ENCOUNTER: ICD-10-CM

## 2025-03-15 DIAGNOSIS — I46.9 CARDIAC ARREST: Primary | ICD-10-CM

## 2025-03-15 DIAGNOSIS — R79.89 ELEVATED BRAIN NATRIURETIC PEPTIDE (BNP) LEVEL: ICD-10-CM

## 2025-03-15 DIAGNOSIS — I42.9 CARDIOMYOPATHY, UNSPECIFIED TYPE (HCC): ICD-10-CM

## 2025-03-15 DIAGNOSIS — R79.89 TROPONIN LEVEL ELEVATED: ICD-10-CM

## 2025-03-15 DIAGNOSIS — R56.9 SEIZURE (HCC): ICD-10-CM

## 2025-03-15 LAB
ALBUMIN SERPL-MCNC: 3.6 G/DL (ref 3.4–5)
ALBUMIN/GLOB SERPL: 1.3 {RATIO} (ref 1.1–2.2)
ALP SERPL-CCNC: 221 U/L (ref 40–129)
ALT SERPL-CCNC: 52 U/L (ref 10–40)
ANION GAP SERPL CALCULATED.3IONS-SCNC: 15 MMOL/L (ref 9–17)
ANION GAP SERPL CALCULATED.3IONS-SCNC: 19 MMOL/L (ref 9–17)
ANION GAP SERPL CALCULATED.3IONS-SCNC: 22 MMOL/L (ref 9–17)
ARTERIAL PATENCY WRIST A: ABNORMAL
ARTERIAL PATENCY WRIST A: ABNORMAL
AST SERPL-CCNC: 68 U/L (ref 15–37)
B PARAP IS1001 DNA NPH QL NAA+NON-PROBE: NOT DETECTED
B PERT DNA SPEC QL NAA+PROBE: NOT DETECTED
BASOPHILS # BLD: 0.07 K/UL
BASOPHILS NFR BLD: 1 % (ref 0–1)
BILIRUB SERPL-MCNC: 1.2 MG/DL (ref 0–1)
BILIRUB UR QL STRIP: NEGATIVE
BNP SERPL-MCNC: 6148 PG/ML (ref 0–125)
BODY TEMPERATURE: 37
BODY TEMPERATURE: 37
BUN SERPL-MCNC: 38 MG/DL (ref 7–20)
BUN SERPL-MCNC: 43 MG/DL (ref 7–20)
BUN SERPL-MCNC: 45 MG/DL (ref 7–20)
C PNEUM DNA NPH QL NAA+NON-PROBE: NOT DETECTED
CALCIUM SERPL-MCNC: 8.2 MG/DL (ref 8.3–10.6)
CALCIUM SERPL-MCNC: 9.1 MG/DL (ref 8.3–10.6)
CALCIUM SERPL-MCNC: 9.4 MG/DL (ref 8.3–10.6)
CASTS #/AREA URNS LPF: ABNORMAL /LPF
CHLORIDE SERPL-SCNC: 103 MMOL/L (ref 99–110)
CHLORIDE SERPL-SCNC: 103 MMOL/L (ref 99–110)
CHLORIDE SERPL-SCNC: 105 MMOL/L (ref 99–110)
CLARITY UR: CLEAR
CO2 SERPL-SCNC: 14 MMOL/L (ref 21–32)
CO2 SERPL-SCNC: 14 MMOL/L (ref 21–32)
CO2 SERPL-SCNC: 16 MMOL/L (ref 21–32)
COHGB MFR BLD: 1.8 % (ref 0.5–1.5)
COHGB MFR BLD: 4.1 % (ref 0.5–1.5)
COLOR UR: YELLOW
CREAT SERPL-MCNC: 1.4 MG/DL (ref 0.9–1.3)
CREAT SERPL-MCNC: 1.7 MG/DL (ref 0.9–1.3)
CREAT SERPL-MCNC: 1.7 MG/DL (ref 0.9–1.3)
EOSINOPHIL # BLD: 0.14 K/UL
EOSINOPHILS RELATIVE PERCENT: 2 % (ref 0–3)
EPI CELLS #/AREA URNS HPF: 6 /HPF
ERYTHROCYTE [DISTWIDTH] IN BLOOD BY AUTOMATED COUNT: 14.7 % (ref 11.7–14.9)
FLUAV RNA NPH QL NAA+NON-PROBE: NOT DETECTED
FLUBV RNA NPH QL NAA+NON-PROBE: NOT DETECTED
GAS FLOW.O2 O2 DELIVERY SYS: ABNORMAL L/MIN
GFR, ESTIMATED: 41 ML/MIN/1.73M2
GFR, ESTIMATED: 41 ML/MIN/1.73M2
GFR, ESTIMATED: 51 ML/MIN/1.73M2
GLUCOSE BLD-MCNC: 100 MG/DL (ref 74–99)
GLUCOSE SERPL-MCNC: 105 MG/DL (ref 74–99)
GLUCOSE SERPL-MCNC: 155 MG/DL (ref 74–99)
GLUCOSE SERPL-MCNC: 180 MG/DL (ref 74–99)
GLUCOSE UR STRIP-MCNC: 100 MG/DL
HADV DNA NPH QL NAA+NON-PROBE: NOT DETECTED
HCO3 VENOUS: 13.2 MMOL/L (ref 22–29)
HCO3 VENOUS: 14.4 MMOL/L (ref 22–29)
HCOV 229E RNA NPH QL NAA+NON-PROBE: NOT DETECTED
HCOV HKU1 RNA NPH QL NAA+NON-PROBE: NOT DETECTED
HCOV NL63 RNA NPH QL NAA+NON-PROBE: NOT DETECTED
HCOV OC43 RNA NPH QL NAA+NON-PROBE: NOT DETECTED
HCT VFR BLD AUTO: 45.7 % (ref 42–52)
HGB BLD-MCNC: 14.3 G/DL (ref 13.5–18)
HGB UR QL STRIP.AUTO: ABNORMAL
HMPV RNA NPH QL NAA+NON-PROBE: NOT DETECTED
HPIV1 RNA NPH QL NAA+NON-PROBE: NOT DETECTED
HPIV2 RNA NPH QL NAA+NON-PROBE: NOT DETECTED
HPIV3 RNA NPH QL NAA+NON-PROBE: NOT DETECTED
HPIV4 RNA NPH QL NAA+NON-PROBE: NOT DETECTED
INR PPP: 1.4
KETONES UR STRIP-MCNC: NEGATIVE MG/DL
LACTATE BLDV-SCNC: 1.1 MMOL/L (ref 0.4–2)
LACTATE BLDV-SCNC: 2.8 MMOL/L (ref 0.4–2)
LACTATE BLDV-SCNC: 9.5 MMOL/L (ref 0.4–2)
LEUKOCYTE ESTERASE UR QL STRIP: ABNORMAL
LIPASE SERPL-CCNC: 27 U/L (ref 13–60)
LYMPHOCYTES NFR BLD: 2.41 K/UL
LYMPHOCYTES RELATIVE PERCENT: 34 % (ref 24–44)
M PNEUMO DNA NPH QL NAA+NON-PROBE: NOT DETECTED
MAGNESIUM SERPL-MCNC: 1.9 MG/DL (ref 1.8–2.4)
MAGNESIUM SERPL-MCNC: 2.1 MG/DL (ref 1.8–2.4)
MAGNESIUM SERPL-MCNC: 2.1 MG/DL (ref 1.8–2.4)
MCH RBC QN AUTO: 30.2 PG (ref 27–31)
MCHC RBC AUTO-ENTMCNC: 31.3 G/DL (ref 32–36)
MCV RBC AUTO: 96.4 FL (ref 78–100)
METAMYELOCYTES ABSOLUTE COUNT: 0.21 K/UL
METAMYELOCYTES: 3 %
METHEMOGLOBIN: 0.4 % (ref 0.5–1.5)
METHEMOGLOBIN: 0.5 % (ref 0.5–1.5)
MONOCYTES NFR BLD: 0.92 K/UL
MONOCYTES NFR BLD: 13 % (ref 0–4)
MUCOUS THREADS URNS QL MICRO: ABNORMAL
NEGATIVE BASE EXCESS, VEN: 14 MMOL/L (ref 0–3)
NEGATIVE BASE EXCESS, VEN: 17.7 MMOL/L (ref 0–3)
NEUTROPHILS NFR BLD: 47 % (ref 36–66)
NEUTS SEG NFR BLD: 3.34 K/UL
NITRITE UR QL STRIP: NEGATIVE
NUCLEATED RED BLOOD CELLS: 2 PER 100 WBC
OXYHGB MFR BLD: 69.7 %
OXYHGB MFR BLD: 73.5 %
PARTIAL THROMBOPLASTIN TIME: 31 SEC (ref 25.1–37.1)
PCO2 VENOUS: 42.3 MM HG (ref 38–54)
PCO2 VENOUS: 51.4 MM HG (ref 38–54)
PH UR STRIP: 5.5 [PH] (ref 5–8)
PH VENOUS: 7.03 (ref 7.32–7.43)
PH VENOUS: 7.15 (ref 7.32–7.43)
PHOSPHATE SERPL-MCNC: 6.5 MG/DL (ref 2.5–4.9)
PHOSPHATE SERPL-MCNC: 6.7 MG/DL (ref 2.5–4.9)
PLATELET # BLD AUTO: 172 K/UL (ref 140–440)
PLATELET ESTIMATE: NORMAL
PMV BLD AUTO: 10.8 FL (ref 7.5–11.1)
PO2 VENOUS: 45.5 MM HG (ref 23–48)
PO2 VENOUS: 56.5 MM HG (ref 23–48)
POTASSIUM SERPL-SCNC: 4.3 MMOL/L (ref 3.5–5.1)
POTASSIUM SERPL-SCNC: 4.8 MMOL/L (ref 3.5–5.1)
POTASSIUM SERPL-SCNC: 5.1 MMOL/L (ref 3.5–5.1)
PROT SERPL-MCNC: 6.4 G/DL (ref 6.4–8.2)
PROT UR STRIP-MCNC: >=300 MG/DL
PROTHROMBIN TIME: 17.5 SEC (ref 11.7–14.5)
RBC # BLD AUTO: 4.74 M/UL (ref 4.6–6.2)
RBC # BLD: NORMAL 10*6/UL
RBC #/AREA URNS HPF: 74 /HPF (ref 0–2)
RSV RNA NPH QL NAA+NON-PROBE: NOT DETECTED
RV+EV RNA NPH QL NAA+NON-PROBE: NOT DETECTED
SARS-COV-2 RDRP RESP QL NAA+PROBE: NOT DETECTED
SARS-COV-2 RNA NPH QL NAA+NON-PROBE: NOT DETECTED
SODIUM SERPL-SCNC: 135 MMOL/L (ref 136–145)
SODIUM SERPL-SCNC: 136 MMOL/L (ref 136–145)
SODIUM SERPL-SCNC: 139 MMOL/L (ref 136–145)
SP GR UR STRIP: 1.02 (ref 1–1.03)
SPECIMEN DESCRIPTION: NORMAL
SPECIMEN DESCRIPTION: NORMAL
TEXT FOR RESPIRATORY: ABNORMAL
TEXT FOR RESPIRATORY: ABNORMAL
TRIGL SERPL-MCNC: 586 MG/DL
TROPONIN I SERPL HS-MCNC: 251 NG/L (ref 0–22)
TROPONIN I SERPL HS-MCNC: 54 NG/L (ref 0–22)
UROBILINOGEN UR STRIP-ACNC: 0.2 EU/DL (ref 0–1)
WBC # BLD: NORMAL 10*3/UL
WBC #/AREA URNS HPF: 28 /HPF (ref 0–5)
WBC OTHER # BLD: 7.1 K/UL (ref 4–10.5)

## 2025-03-15 PROCEDURE — 85730 THROMBOPLASTIN TIME PARTIAL: CPT

## 2025-03-15 PROCEDURE — 85610 PROTHROMBIN TIME: CPT

## 2025-03-15 PROCEDURE — 82962 GLUCOSE BLOOD TEST: CPT

## 2025-03-15 PROCEDURE — 6360000002 HC RX W HCPCS: Performed by: EMERGENCY MEDICINE

## 2025-03-15 PROCEDURE — 2500000003 HC RX 250 WO HCPCS: Performed by: PHYSICIAN ASSISTANT

## 2025-03-15 PROCEDURE — 6370000000 HC RX 637 (ALT 250 FOR IP): Performed by: PHYSICIAN ASSISTANT

## 2025-03-15 PROCEDURE — 83880 ASSAY OF NATRIURETIC PEPTIDE: CPT

## 2025-03-15 PROCEDURE — 89220 SPUTUM SPECIMEN COLLECTION: CPT

## 2025-03-15 PROCEDURE — 71045 X-RAY EXAM CHEST 1 VIEW: CPT

## 2025-03-15 PROCEDURE — 87635 SARS-COV-2 COVID-19 AMP PRB: CPT

## 2025-03-15 PROCEDURE — 36569 INSJ PICC 5 YR+ W/O IMAGING: CPT

## 2025-03-15 PROCEDURE — 95705 EEG W/O VID 2-12 HR UNMNTR: CPT

## 2025-03-15 PROCEDURE — 6360000002 HC RX W HCPCS: Performed by: PHYSICIAN ASSISTANT

## 2025-03-15 PROCEDURE — 2500000003 HC RX 250 WO HCPCS: Performed by: INTERNAL MEDICINE

## 2025-03-15 PROCEDURE — 94761 N-INVAS EAR/PLS OXIMETRY MLT: CPT

## 2025-03-15 PROCEDURE — 84484 ASSAY OF TROPONIN QUANT: CPT

## 2025-03-15 PROCEDURE — 83735 ASSAY OF MAGNESIUM: CPT

## 2025-03-15 PROCEDURE — 82805 BLOOD GASES W/O2 SATURATION: CPT

## 2025-03-15 PROCEDURE — C1751 CATH, INF, PER/CENT/MIDLINE: HCPCS

## 2025-03-15 PROCEDURE — 72125 CT NECK SPINE W/O DYE: CPT

## 2025-03-15 PROCEDURE — 84478 ASSAY OF TRIGLYCERIDES: CPT

## 2025-03-15 PROCEDURE — 0202U NFCT DS 22 TRGT SARS-COV-2: CPT

## 2025-03-15 PROCEDURE — 2000000000 HC ICU R&B

## 2025-03-15 PROCEDURE — 80053 COMPREHEN METABOLIC PANEL: CPT

## 2025-03-15 PROCEDURE — 84100 ASSAY OF PHOSPHORUS: CPT

## 2025-03-15 PROCEDURE — 6360000002 HC RX W HCPCS: Performed by: INTERNAL MEDICINE

## 2025-03-15 PROCEDURE — 87040 BLOOD CULTURE FOR BACTERIA: CPT

## 2025-03-15 PROCEDURE — 2580000003 HC RX 258: Performed by: INTERNAL MEDICINE

## 2025-03-15 PROCEDURE — 83605 ASSAY OF LACTIC ACID: CPT

## 2025-03-15 PROCEDURE — 93005 ELECTROCARDIOGRAM TRACING: CPT

## 2025-03-15 PROCEDURE — 99285 EMERGENCY DEPT VISIT HI MDM: CPT

## 2025-03-15 PROCEDURE — 31500 INSERT EMERGENCY AIRWAY: CPT

## 2025-03-15 PROCEDURE — 6360000002 HC RX W HCPCS

## 2025-03-15 PROCEDURE — 2580000003 HC RX 258: Performed by: EMERGENCY MEDICINE

## 2025-03-15 PROCEDURE — 94640 AIRWAY INHALATION TREATMENT: CPT

## 2025-03-15 PROCEDURE — 2709999900 HC NON-CHARGEABLE SUPPLY

## 2025-03-15 PROCEDURE — 2580000003 HC RX 258: Performed by: PHYSICIAN ASSISTANT

## 2025-03-15 PROCEDURE — 96374 THER/PROPH/DIAG INJ IV PUSH: CPT

## 2025-03-15 PROCEDURE — 2700000000 HC OXYGEN THERAPY PER DAY

## 2025-03-15 PROCEDURE — 76937 US GUIDE VASCULAR ACCESS: CPT

## 2025-03-15 PROCEDURE — 80048 BASIC METABOLIC PNL TOTAL CA: CPT

## 2025-03-15 PROCEDURE — 87641 MR-STAPH DNA AMP PROBE: CPT

## 2025-03-15 PROCEDURE — 83690 ASSAY OF LIPASE: CPT

## 2025-03-15 PROCEDURE — 94002 VENT MGMT INPAT INIT DAY: CPT

## 2025-03-15 PROCEDURE — 02HV33Z INSERTION OF INFUSION DEVICE INTO SUPERIOR VENA CAVA, PERCUTANEOUS APPROACH: ICD-10-PCS | Performed by: SPECIALIST

## 2025-03-15 PROCEDURE — 2500000003 HC RX 250 WO HCPCS: Performed by: EMERGENCY MEDICINE

## 2025-03-15 PROCEDURE — 36415 COLL VENOUS BLD VENIPUNCTURE: CPT

## 2025-03-15 PROCEDURE — 0DH67UZ INSERTION OF FEEDING DEVICE INTO STOMACH, VIA NATURAL OR ARTIFICIAL OPENING: ICD-10-PCS | Performed by: SPECIALIST

## 2025-03-15 PROCEDURE — 70450 CT HEAD/BRAIN W/O DYE: CPT

## 2025-03-15 PROCEDURE — C9254 INJECTION, LACOSAMIDE: HCPCS | Performed by: INTERNAL MEDICINE

## 2025-03-15 PROCEDURE — 81001 URINALYSIS AUTO W/SCOPE: CPT

## 2025-03-15 PROCEDURE — 6360000002 HC RX W HCPCS: Performed by: STUDENT IN AN ORGANIZED HEALTH CARE EDUCATION/TRAINING PROGRAM

## 2025-03-15 PROCEDURE — 5A1935Z RESPIRATORY VENTILATION, LESS THAN 24 CONSECUTIVE HOURS: ICD-10-PCS | Performed by: SPECIALIST

## 2025-03-15 PROCEDURE — 83721 ASSAY OF BLOOD LIPOPROTEIN: CPT

## 2025-03-15 PROCEDURE — 85025 COMPLETE CBC W/AUTO DIFF WBC: CPT

## 2025-03-15 PROCEDURE — 74018 RADEX ABDOMEN 1 VIEW: CPT

## 2025-03-15 PROCEDURE — 0BH17EZ INSERTION OF ENDOTRACHEAL AIRWAY INTO TRACHEA, VIA NATURAL OR ARTIFICIAL OPENING: ICD-10-PCS | Performed by: SPECIALIST

## 2025-03-15 RX ORDER — SODIUM CHLORIDE 9 MG/ML
INJECTION, SOLUTION INTRAVENOUS PRN
Status: DISCONTINUED | OUTPATIENT
Start: 2025-03-15 | End: 2025-03-15 | Stop reason: SDUPTHER

## 2025-03-15 RX ORDER — SODIUM CHLORIDE 0.9 % (FLUSH) 0.9 %
5-40 SYRINGE (ML) INJECTION PRN
Status: DISCONTINUED | OUTPATIENT
Start: 2025-03-15 | End: 2025-03-16 | Stop reason: HOSPADM

## 2025-03-15 RX ORDER — ONDANSETRON 2 MG/ML
4 INJECTION INTRAMUSCULAR; INTRAVENOUS EVERY 6 HOURS PRN
Status: DISCONTINUED | OUTPATIENT
Start: 2025-03-15 | End: 2025-03-16 | Stop reason: HOSPADM

## 2025-03-15 RX ORDER — FENTANYL CITRATE 50 UG/ML
50 INJECTION, SOLUTION INTRAMUSCULAR; INTRAVENOUS ONCE
Status: COMPLETED | OUTPATIENT
Start: 2025-03-15 | End: 2025-03-15

## 2025-03-15 RX ORDER — LORAZEPAM 2 MG/ML
INJECTION INTRAMUSCULAR
Status: COMPLETED
Start: 2025-03-15 | End: 2025-03-15

## 2025-03-15 RX ORDER — MIDAZOLAM HYDROCHLORIDE 2 MG/2ML
2 INJECTION, SOLUTION INTRAMUSCULAR; INTRAVENOUS ONCE
Status: COMPLETED | OUTPATIENT
Start: 2025-03-15 | End: 2025-03-15

## 2025-03-15 RX ORDER — VASOPRESSIN IN DEXTROSE 5 % 20/100 ML
.01-.03 PLASTIC BAG, INJECTION (ML) INTRAVENOUS CONTINUOUS
Status: DISCONTINUED | OUTPATIENT
Start: 2025-03-15 | End: 2025-03-15

## 2025-03-15 RX ORDER — MIDAZOLAM HYDROCHLORIDE 1 MG/ML
INJECTION, SOLUTION INTRAMUSCULAR; INTRAVENOUS
Status: COMPLETED
Start: 2025-03-15 | End: 2025-03-15

## 2025-03-15 RX ORDER — IPRATROPIUM BROMIDE AND ALBUTEROL SULFATE 2.5; .5 MG/3ML; MG/3ML
1 SOLUTION RESPIRATORY (INHALATION)
Status: DISCONTINUED | OUTPATIENT
Start: 2025-03-15 | End: 2025-03-16 | Stop reason: HOSPADM

## 2025-03-15 RX ORDER — PANTOPRAZOLE SODIUM 40 MG/10ML
40 INJECTION, POWDER, LYOPHILIZED, FOR SOLUTION INTRAVENOUS DAILY
Status: DISCONTINUED | OUTPATIENT
Start: 2025-03-15 | End: 2025-03-15

## 2025-03-15 RX ORDER — CHLORHEXIDINE GLUCONATE ORAL RINSE 1.2 MG/ML
15 SOLUTION DENTAL 2 TIMES DAILY
Status: DISCONTINUED | OUTPATIENT
Start: 2025-03-15 | End: 2025-03-16 | Stop reason: HOSPADM

## 2025-03-15 RX ORDER — VASOPRESSIN IN DEXTROSE 5 % 20/100 ML
0.03 PLASTIC BAG, INJECTION (ML) INTRAVENOUS CONTINUOUS
Status: DISCONTINUED | OUTPATIENT
Start: 2025-03-15 | End: 2025-03-16 | Stop reason: HOSPADM

## 2025-03-15 RX ORDER — ARIPIPRAZOLE 20 MG/1
20 TABLET ORAL DAILY
COMMUNITY

## 2025-03-15 RX ORDER — SPIRONOLACTONE 25 MG/1
12.5 TABLET ORAL DAILY
Status: DISCONTINUED | OUTPATIENT
Start: 2025-03-16 | End: 2025-03-15

## 2025-03-15 RX ORDER — ACETAMINOPHEN 325 MG/1
650 TABLET ORAL EVERY 6 HOURS PRN
Status: DISCONTINUED | OUTPATIENT
Start: 2025-03-15 | End: 2025-03-15

## 2025-03-15 RX ORDER — MAGNESIUM SULFATE IN WATER 40 MG/ML
2000 INJECTION, SOLUTION INTRAVENOUS PRN
Status: DISCONTINUED | OUTPATIENT
Start: 2025-03-15 | End: 2025-03-16 | Stop reason: HOSPADM

## 2025-03-15 RX ORDER — LEVETIRACETAM 500 MG/5ML
1000 INJECTION, SOLUTION, CONCENTRATE INTRAVENOUS ONCE
Status: COMPLETED | OUTPATIENT
Start: 2025-03-15 | End: 2025-03-15

## 2025-03-15 RX ORDER — LISINOPRIL 5 MG/1
2.5 TABLET ORAL DAILY
Status: DISCONTINUED | OUTPATIENT
Start: 2025-03-15 | End: 2025-03-16 | Stop reason: HOSPADM

## 2025-03-15 RX ORDER — ACETAMINOPHEN 650 MG/1
650 SUPPOSITORY RECTAL EVERY 6 HOURS PRN
Status: DISCONTINUED | OUTPATIENT
Start: 2025-03-15 | End: 2025-03-16 | Stop reason: HOSPADM

## 2025-03-15 RX ORDER — SODIUM CHLORIDE 9 MG/ML
INJECTION, SOLUTION INTRAVENOUS PRN
Status: DISCONTINUED | OUTPATIENT
Start: 2025-03-15 | End: 2025-03-16 | Stop reason: HOSPADM

## 2025-03-15 RX ORDER — 0.9 % SODIUM CHLORIDE 0.9 %
1000 INTRAVENOUS SOLUTION INTRAVENOUS ONCE
Status: DISCONTINUED | OUTPATIENT
Start: 2025-03-15 | End: 2025-03-16 | Stop reason: HOSPADM

## 2025-03-15 RX ORDER — LORAZEPAM 2 MG/ML
2 INJECTION INTRAMUSCULAR ONCE
Status: COMPLETED | OUTPATIENT
Start: 2025-03-15 | End: 2025-03-15

## 2025-03-15 RX ORDER — LEVETIRACETAM 500 MG/5ML
1000 INJECTION, SOLUTION, CONCENTRATE INTRAVENOUS EVERY 12 HOURS
Status: DISCONTINUED | OUTPATIENT
Start: 2025-03-15 | End: 2025-03-15

## 2025-03-15 RX ORDER — FUROSEMIDE 10 MG/ML
20 INJECTION INTRAMUSCULAR; INTRAVENOUS 2 TIMES DAILY
Status: DISCONTINUED | OUTPATIENT
Start: 2025-03-16 | End: 2025-03-16 | Stop reason: HOSPADM

## 2025-03-15 RX ORDER — LORAZEPAM 2 MG/ML
4 INJECTION INTRAMUSCULAR
Status: DISCONTINUED | OUTPATIENT
Start: 2025-03-15 | End: 2025-03-16 | Stop reason: HOSPADM

## 2025-03-15 RX ORDER — NOREPINEPHRINE BITARTRATE 0.06 MG/ML
1-100 INJECTION, SOLUTION INTRAVENOUS CONTINUOUS
Status: DISCONTINUED | OUTPATIENT
Start: 2025-03-15 | End: 2025-03-16 | Stop reason: HOSPADM

## 2025-03-15 RX ORDER — SODIUM CHLORIDE 0.9 % (FLUSH) 0.9 %
5-40 SYRINGE (ML) INJECTION EVERY 12 HOURS SCHEDULED
Status: DISCONTINUED | OUTPATIENT
Start: 2025-03-15 | End: 2025-03-16 | Stop reason: HOSPADM

## 2025-03-15 RX ORDER — ACETAMINOPHEN 650 MG/20.3ML
1000 LIQUID ORAL EVERY 8 HOURS PRN
Status: DISCONTINUED | OUTPATIENT
Start: 2025-03-15 | End: 2025-03-16 | Stop reason: HOSPADM

## 2025-03-15 RX ORDER — 0.9 % SODIUM CHLORIDE 0.9 %
1000 INTRAVENOUS SOLUTION INTRAVENOUS ONCE
Status: COMPLETED | OUTPATIENT
Start: 2025-03-15 | End: 2025-03-15

## 2025-03-15 RX ORDER — HEPARIN SODIUM 5000 [USP'U]/ML
5000 INJECTION, SOLUTION INTRAVENOUS; SUBCUTANEOUS EVERY 8 HOURS SCHEDULED
Status: DISCONTINUED | OUTPATIENT
Start: 2025-03-15 | End: 2025-03-16 | Stop reason: HOSPADM

## 2025-03-15 RX ORDER — ENOXAPARIN SODIUM 100 MG/ML
40 INJECTION SUBCUTANEOUS DAILY
Status: DISCONTINUED | OUTPATIENT
Start: 2025-03-15 | End: 2025-03-15

## 2025-03-15 RX ORDER — TRAZODONE HYDROCHLORIDE 50 MG/1
50 TABLET ORAL NIGHTLY
Status: DISCONTINUED | OUTPATIENT
Start: 2025-03-15 | End: 2025-03-15

## 2025-03-15 RX ORDER — FENTANYL CITRATE 50 UG/ML
50 INJECTION, SOLUTION INTRAMUSCULAR; INTRAVENOUS
Status: DISCONTINUED | OUTPATIENT
Start: 2025-03-15 | End: 2025-03-16 | Stop reason: HOSPADM

## 2025-03-15 RX ORDER — MIDAZOLAM HYDROCHLORIDE 2 MG/2ML
4 INJECTION, SOLUTION INTRAMUSCULAR; INTRAVENOUS ONCE
Status: COMPLETED | OUTPATIENT
Start: 2025-03-15 | End: 2025-03-15

## 2025-03-15 RX ORDER — PROPOFOL 10 MG/ML
5-50 INJECTION, EMULSION INTRAVENOUS CONTINUOUS
Status: DISCONTINUED | OUTPATIENT
Start: 2025-03-15 | End: 2025-03-15 | Stop reason: SDUPTHER

## 2025-03-15 RX ORDER — ATORVASTATIN CALCIUM 10 MG/1
10 TABLET, FILM COATED ORAL DAILY
Status: DISCONTINUED | OUTPATIENT
Start: 2025-03-16 | End: 2025-03-16 | Stop reason: HOSPADM

## 2025-03-15 RX ORDER — EPINEPHRINE 1 MG/ML
INJECTION, SOLUTION INTRAMUSCULAR; SUBCUTANEOUS
Status: DISCONTINUED
Start: 2025-03-15 | End: 2025-03-15 | Stop reason: WASHOUT

## 2025-03-15 RX ORDER — POLYETHYLENE GLYCOL 3350 17 G/17G
17 POWDER, FOR SOLUTION ORAL DAILY PRN
Status: DISCONTINUED | OUTPATIENT
Start: 2025-03-15 | End: 2025-03-16 | Stop reason: HOSPADM

## 2025-03-15 RX ORDER — POTASSIUM CHLORIDE 7.45 MG/ML
10 INJECTION INTRAVENOUS PRN
Status: DISCONTINUED | OUTPATIENT
Start: 2025-03-15 | End: 2025-03-16 | Stop reason: HOSPADM

## 2025-03-15 RX ORDER — MIDAZOLAM HYDROCHLORIDE 2 MG/2ML
4 INJECTION, SOLUTION INTRAMUSCULAR; INTRAVENOUS AS NEEDED
Status: DISCONTINUED | OUTPATIENT
Start: 2025-03-15 | End: 2025-03-16 | Stop reason: HOSPADM

## 2025-03-15 RX ORDER — FAMOTIDINE 10 MG/ML
20 INJECTION, SOLUTION INTRAVENOUS 2 TIMES DAILY
Status: DISCONTINUED | OUTPATIENT
Start: 2025-03-15 | End: 2025-03-15

## 2025-03-15 RX ORDER — PROPOFOL 10 MG/ML
5-50 INJECTION, EMULSION INTRAVENOUS CONTINUOUS
Status: DISCONTINUED | OUTPATIENT
Start: 2025-03-15 | End: 2025-03-15

## 2025-03-15 RX ORDER — LIDOCAINE HYDROCHLORIDE 10 MG/ML
50 INJECTION, SOLUTION INFILTRATION; PERINEURAL ONCE
Status: DISCONTINUED | OUTPATIENT
Start: 2025-03-15 | End: 2025-03-16 | Stop reason: HOSPADM

## 2025-03-15 RX ORDER — POTASSIUM CHLORIDE 29.8 MG/ML
20 INJECTION INTRAVENOUS PRN
Status: DISCONTINUED | OUTPATIENT
Start: 2025-03-15 | End: 2025-03-16 | Stop reason: HOSPADM

## 2025-03-15 RX ORDER — LORAZEPAM 2 MG/ML
2 INJECTION INTRAMUSCULAR
Status: DISCONTINUED | OUTPATIENT
Start: 2025-03-15 | End: 2025-03-15

## 2025-03-15 RX ORDER — PROPOFOL 10 MG/ML
50 INJECTION, EMULSION INTRAVENOUS CONTINUOUS
Status: DISCONTINUED | OUTPATIENT
Start: 2025-03-15 | End: 2025-03-16 | Stop reason: HOSPADM

## 2025-03-15 RX ORDER — PANTOPRAZOLE SODIUM 40 MG/10ML
40 INJECTION, POWDER, LYOPHILIZED, FOR SOLUTION INTRAVENOUS 2 TIMES DAILY
Status: DISCONTINUED | OUTPATIENT
Start: 2025-03-15 | End: 2025-03-16 | Stop reason: HOSPADM

## 2025-03-15 RX ORDER — ASPIRIN 81 MG/1
81 TABLET, CHEWABLE ORAL DAILY
Status: DISCONTINUED | OUTPATIENT
Start: 2025-03-15 | End: 2025-03-16 | Stop reason: HOSPADM

## 2025-03-15 RX ORDER — FENTANYL CITRATE-0.9 % NACL/PF 10 MCG/ML
25-100 PLASTIC BAG, INJECTION (ML) INTRAVENOUS CONTINUOUS
Refills: 0 | Status: DISCONTINUED | OUTPATIENT
Start: 2025-03-15 | End: 2025-03-16 | Stop reason: HOSPADM

## 2025-03-15 RX ORDER — CARVEDILOL 6.25 MG/1
3.12 TABLET ORAL 2 TIMES DAILY WITH MEALS
Status: DISCONTINUED | OUTPATIENT
Start: 2025-03-16 | End: 2025-03-16 | Stop reason: HOSPADM

## 2025-03-15 RX ORDER — LEVETIRACETAM 500 MG/5ML
1000 INJECTION, SOLUTION, CONCENTRATE INTRAVENOUS EVERY 12 HOURS
Status: DISCONTINUED | OUTPATIENT
Start: 2025-03-16 | End: 2025-03-16 | Stop reason: HOSPADM

## 2025-03-15 RX ORDER — ONDANSETRON 4 MG/1
4 TABLET, ORALLY DISINTEGRATING ORAL EVERY 8 HOURS PRN
Status: DISCONTINUED | OUTPATIENT
Start: 2025-03-15 | End: 2025-03-16 | Stop reason: HOSPADM

## 2025-03-15 RX ADMIN — SODIUM CHLORIDE 1000 ML: 9 INJECTION, SOLUTION INTRAVENOUS at 13:26

## 2025-03-15 RX ADMIN — PROPOFOL 20 MCG/KG/MIN: 10 INJECTION, EMULSION INTRAVENOUS at 13:36

## 2025-03-15 RX ADMIN — PROPOFOL 20 MCG/KG/MIN: 10 INJECTION, EMULSION INTRAVENOUS at 13:25

## 2025-03-15 RX ADMIN — VASOPRESSIN 0.03 UNITS/MIN: 0.2 INJECTION INTRAVENOUS at 18:28

## 2025-03-15 RX ADMIN — Medication 100 MCG/HR: at 23:27

## 2025-03-15 RX ADMIN — MIDAZOLAM HYDROCHLORIDE 8 MG/HR: 5 INJECTION, SOLUTION INTRAMUSCULAR; INTRAVENOUS at 20:41

## 2025-03-15 RX ADMIN — LEVETIRACETAM 1000 MG: 500 INJECTION INTRAVENOUS at 20:26

## 2025-03-15 RX ADMIN — LORAZEPAM 2 MG: 2 INJECTION INTRAMUSCULAR; INTRAVENOUS at 17:39

## 2025-03-15 RX ADMIN — SODIUM CHLORIDE, PRESERVATIVE FREE 10 ML: 5 INJECTION INTRAVENOUS at 22:30

## 2025-03-15 RX ADMIN — NOREPINEPHRINE BITARTRATE 5 MCG/MIN: 0.06 INJECTION, SOLUTION INTRAVENOUS at 13:46

## 2025-03-15 RX ADMIN — LEVETIRACETAM 2500 MG: 100 INJECTION INTRAVENOUS at 19:56

## 2025-03-15 RX ADMIN — IPRATROPIUM BROMIDE AND ALBUTEROL SULFATE 1 DOSE: .5; 2.5 SOLUTION RESPIRATORY (INHALATION) at 19:09

## 2025-03-15 RX ADMIN — CHLORHEXIDINE GLUCONATE 0.12% ORAL RINSE 15 ML: 1.2 LIQUID ORAL at 20:49

## 2025-03-15 RX ADMIN — SODIUM CHLORIDE, PRESERVATIVE FREE 10 ML: 5 INJECTION INTRAVENOUS at 22:29

## 2025-03-15 RX ADMIN — HEPARIN SODIUM 5000 UNITS: 5000 INJECTION INTRAVENOUS; SUBCUTANEOUS at 22:29

## 2025-03-15 RX ADMIN — MIDAZOLAM 4 MG: 1 INJECTION INTRAMUSCULAR; INTRAVENOUS at 20:06

## 2025-03-15 RX ADMIN — HEPARIN SODIUM 5000 UNITS: 5000 INJECTION INTRAVENOUS; SUBCUTANEOUS at 18:46

## 2025-03-15 RX ADMIN — IPRATROPIUM BROMIDE AND ALBUTEROL SULFATE 1 DOSE: .5; 2.5 SOLUTION RESPIRATORY (INHALATION) at 22:48

## 2025-03-15 RX ADMIN — VANCOMYCIN HYDROCHLORIDE 2000 MG: 5 INJECTION, POWDER, LYOPHILIZED, FOR SOLUTION INTRAVENOUS at 20:30

## 2025-03-15 RX ADMIN — ASPIRIN 81 MG: 81 TABLET, CHEWABLE ORAL at 18:46

## 2025-03-15 RX ADMIN — Medication 50 MCG/HR: at 14:00

## 2025-03-15 RX ADMIN — CHLORHEXIDINE GLUCONATE 0.12% ORAL RINSE 15 ML: 1.2 LIQUID ORAL at 18:26

## 2025-03-15 RX ADMIN — MIDAZOLAM HYDROCHLORIDE 2 MG: 2 INJECTION, SOLUTION INTRAMUSCULAR; INTRAVENOUS at 19:52

## 2025-03-15 RX ADMIN — MIDAZOLAM HYDROCHLORIDE 4 MG: 2 INJECTION, SOLUTION INTRAMUSCULAR; INTRAVENOUS at 20:06

## 2025-03-15 RX ADMIN — PROPOFOL 20 MCG/KG/MIN: 10 INJECTION, EMULSION INTRAVENOUS at 16:42

## 2025-03-15 RX ADMIN — LEVETIRACETAM 1000 MG: 500 INJECTION INTRAVENOUS at 20:55

## 2025-03-15 RX ADMIN — LORAZEPAM 4 MG: 2 INJECTION INTRAMUSCULAR; INTRAVENOUS at 23:32

## 2025-03-15 RX ADMIN — MIDAZOLAM 2 MG: 1 INJECTION INTRAMUSCULAR; INTRAVENOUS at 19:52

## 2025-03-15 RX ADMIN — PIPERACILLIN AND TAZOBACTAM 4500 MG: 4; .5 INJECTION, POWDER, LYOPHILIZED, FOR SOLUTION INTRAVENOUS; PARENTERAL at 18:56

## 2025-03-15 RX ADMIN — LORAZEPAM 2 MG: 2 INJECTION INTRAMUSCULAR; INTRAVENOUS at 15:15

## 2025-03-15 RX ADMIN — FENTANYL CITRATE 50 MCG: 50 INJECTION, SOLUTION INTRAMUSCULAR; INTRAVENOUS at 13:10

## 2025-03-15 RX ADMIN — PROPOFOL 50 MCG/KG/MIN: 10 INJECTION, EMULSION INTRAVENOUS at 21:29

## 2025-03-15 RX ADMIN — ACETAMINOPHEN 650 MG: 650 SUPPOSITORY RECTAL at 18:32

## 2025-03-15 RX ADMIN — AMIODARONE HYDROCHLORIDE 0.5 MG/MIN: 50 INJECTION, SOLUTION INTRAVENOUS at 14:28

## 2025-03-15 RX ADMIN — PANTOPRAZOLE SODIUM 40 MG: 40 INJECTION, POWDER, FOR SOLUTION INTRAVENOUS at 20:49

## 2025-03-15 RX ADMIN — LORAZEPAM 2 MG: 2 INJECTION INTRAMUSCULAR; INTRAVENOUS at 19:55

## 2025-03-15 RX ADMIN — PROPOFOL 50 MCG/KG/MIN: 10 INJECTION, EMULSION INTRAVENOUS at 20:40

## 2025-03-15 RX ADMIN — LACOSAMIDE 300 MG: 10 INJECTION INTRAVENOUS at 21:33

## 2025-03-15 RX ADMIN — LORAZEPAM 2 MG: 2 INJECTION INTRAMUSCULAR; INTRAVENOUS at 16:30

## 2025-03-15 ASSESSMENT — PULMONARY FUNCTION TESTS
PIF_VALUE: 23
PIF_VALUE: 25
PIF_VALUE: 23
PIF_VALUE: 25
PIF_VALUE: 25
PIF_VALUE: 22
PIF_VALUE: 23
PIF_VALUE: 24
PIF_VALUE: 29
PIF_VALUE: 23
PIF_VALUE: 24
PIF_VALUE: 23
PIF_VALUE: 24
PIF_VALUE: 24
PIF_VALUE: 16
PIF_VALUE: 24
PIF_VALUE: 20
PIF_VALUE: 24
PIF_VALUE: 23
PIF_VALUE: 22
PIF_VALUE: 20

## 2025-03-15 NOTE — H&P
MD Marco     Medications:    sodium chloride  1,000 mL IntraVENous Once    sodium chloride  1,000 mL IntraVENous Once    sodium chloride  1,000 mL IntraVENous Once    sodium chloride flush  5-40 mL IntraVENous 2 times per day    enoxaparin  40 mg SubCUTAneous Daily    ipratropium 0.5 mg-albuterol 2.5 mg  1 Dose Inhalation Q4H RT    chlorhexidine  15 mL Mouth/Throat BID    famotidine (PEPCID) injection  20 mg IntraVENous BID      Infusions:    fentaNYL      amiodarone      sodium chloride      propofol       PRN Meds: sodium chloride flush, 5-40 mL, PRN  sodium chloride, , PRN  potassium chloride, 20 mEq, PRN   Or  potassium chloride, 10 mEq, PRN  magnesium sulfate, 2,000 mg, PRN  ondansetron, 4 mg, Q8H PRN   Or  ondansetron, 4 mg, Q6H PRN  polyethylene glycol, 17 g, Daily PRN  acetaminophen, 650 mg, Q6H PRN   Or  acetaminophen, 650 mg, Q6H PRN  fentanNYL, 50 mcg, Q2H PRN        Recent Labs     03/15/25  1309   WBC 7.1   HGB 14.3   HCT 45.7         No results for input(s): \"NA\", \"K\", \"CL\", \"CO2\", \"PHOS\", \"BUN\", \"CREATININE\" in the last 72 hours.    Invalid input(s): \"CA\"  No results for input(s): \"AST\", \"ALT\", \"BILIDIR\", \"BILITOT\", \"ALKPHOS\" in the last 72 hours.    Invalid input(s): \"ALB\"  No results for input(s): \"INR\" in the last 72 hours.  No results for input(s): \"CKTOTAL\", \"CKMB\", \"CKMBINDEX\", \"TROPONINT\" in the last 72 hours.     Imaging reviewed  No results found.     Total critical care time 40 minutes:  Critical care time does not include separately billable procedures.    Electronically signed by Jeanette Sanchez PA-C on 3/15/2025 at 1:35 PM

## 2025-03-15 NOTE — ED TRIAGE NOTES
Witnessed fall. Walking to car went down. Hit head on car when fell, per wife Pt has defibrillator.

## 2025-03-15 NOTE — PROGRESS NOTES
Patient arrive via EMS with a 7.0C ET tube secured at 28cm at the lips with commercial tube garcia.

## 2025-03-15 NOTE — CONSULTS
PICC consult completed.     Indication for line: Irritant/vesicant medication  Medication/Anticipated Duration: Norepinephrine gtt  Ordering Provider: Jeanette Sanchez PA-C    PICC LINE STATUS: CXR results PICC line with distal tip overlying the atrial caval junction - line OKAY to use    PICC line insertion education provided to patient's NOK, risks and benefits discussed and reviewed with patient's NOK. Patient's next of kin verbalized understanding, questions answered. Consent signed by patient's NOK. Time out done @ 1710. Arrowg+vadim Blue Advance ® Triple lumen PICC line inserted into LUE basilic vein x 1 attempt using sterile ultrasound and modified Seldinger technique with difficulty per protocol.  Shamar wire assistance in obtaining correct PICC placement.  Unable to confirm PICC placement location using Arrow® VPS G4™ vascular positioning system device, unable to obtain Blue Bullseye, STAT CXR ordered. Brisk blood return noted and flushes without resistance on its lumens. Patient tolerated procedure well. Ultrasound photos of selected vein diameter provided below. Printed copy of Blue Bullseye confirming correct PICC line placement placed in chart. Primary nurse notified and aware.    Consult the Vascular Access Team for questions, concerns, or change in patient's condition.

## 2025-03-15 NOTE — PROGRESS NOTES
4 Eyes Skin Assessment     NAME:  Tate Chavarria  YOB: 1965  MEDICAL RECORD NUMBER:  [id number]    The patient is being assess for  Admission    I agree that 2 RN's have performed a thorough Head to Toe Skin Assessment on the patient. ALL assessment sites listed below have been assessed.      Areas assessed by both nurses:    Head, Face, Ears, Shoulders, Back, Chest, Arms, Elbows, Hands, Sacrum. Buttock, Coccyx, Ischium, Legs. Feet and Heels, and Under Medical Devices         Does the Patient have a Wound? Yes wound(s) were present on assessment. LDA wound assessment was Initiated and completed by SALEEM Barron Prevention initiated:  Yes   Wound Care Orders initiated:  Yes    Pressure Injury (Stage 3,4, Unstageable, DTI, NWPT, and Complex wounds) if present place referral/consult order under :: No    New and Established Ostomies if present place consult order under : No      Nurse 1 eSignature: Electronically signed by Teena Coombs RN on 3/15/25 at 6:15 PM EDT    **SHARE this note so that the co-signing nurse is able to place an eSignature**    Nurse 2 eSignature: Electronically signed by Marisa Meehan RN on 3/15/25 at 7:08 PM EDT

## 2025-03-15 NOTE — PROGRESS NOTES
Pt has had 6 episodes of seizure like activity. Ceribell placed at this time. New orders for CEEG and neurology consult. Jacquie PRATER aware.

## 2025-03-15 NOTE — PROGRESS NOTES
03/15/25 1525   Patient Observation   Pulse (!) 114   Respirations 30   Vent Information   Ventilator -16   Ventilator Safety Check Performed Pre-Use Yes   Ventilator Initiate Yes   Vent Mode AC/VC   $Ventilation $Initial Day   Ventilator Settings   Vt (Set, mL) 500 mL   Resp Rate (Set) 16 bpm   PEEP/CPAP (cmH2O) 5   FiO2  100 %   Vent Patient Data (Readings)   Vt (Measured) 476 mL   Peak Inspiratory Pressure (cmH2O) 22 cmH2O   Rate Measured 29 br/min   Minute Volume (L/min) 12 Liters   Mean Airway Pressure (cmH2O) 12 cmH20   Plateau Pressure (cm H2O) 0 cm H2O   Driving Pressure -5   I:E Ratio 1.10:1   Flow Sensitivity 3 L/min   Backup Apnea On   Backup Rate 16 Breaths Per Minute   Backup Vt 500   Vent Alarm Settings   High Pressure (cmH2O) 40 cmH2O   Low Minute Volume (lpm) 2.5 L/min   High Minute Volume (lpm) 20 L/min   Low Exhaled Vt (ml) 250 mL   High Exhaled Vt (ml) 1000 mL   RR High (bpm) 40 br/min   Apnea (secs) 20 secs   Additional Respiratoray Assessments   Humidification Source HME   Circuit Condensation Drained   Ambu Bag With Mask At Bedside Yes   Airway Clearance   Suction ET Tube   Subglottic Suction Done Yes   Suction Device Inline suction catheter   Sputum Method Obtained Endotracheal   Sputum Amount Large   Sputum Color/Odor Yellow;Bloody   Sputum Consistency Thick   $Obtained Sample $Induced Sputum (charge not used for Bronchoscopy)   ETT    Placement Date/Time: 03/15/25 1528   Present on Admission/Arrival: Yes  Placed By: (c) In ED  Location: Oral   $ Intubation Emergent  $ Yes   Secured At 25 cm   Measured From Lips   ETT Placement Right   Secured By Commercial tube palumbo   Tie/Palumbo Changed No

## 2025-03-15 NOTE — PROGRESS NOTES
Patient was transported from CT scanner to ICU room 2112 at this time. ET tube remains at 25cm at the lips. Report given to LUKE Mcintosh.

## 2025-03-15 NOTE — PROGRESS NOTES
Per Dr. Seals, patient's ET tube pulled back to 25cm at the lips from 28cm. Will continue to monitor.

## 2025-03-15 NOTE — PROGRESS NOTES
PHARMACY VANCOMYCIN MONITORING SERVICE  Pharmacy consulted by Jeanette RASMUSSEN for monitoring and adjustment.    Indication for treatment: Vancomycin indication: Other: Sepsis  Goal trough: Trough Goal: 15-20 mcg/mL  AUC/EFREM: 400-600  Day of Therapy: 7      Pertinent Laboratory Values:   Temp Readings from Last 3 Encounters:   03/15/25 100 °F (37.8 °C) (Rectal)   07/23/19 98.6 °F (37 °C) (Oral)   07/12/19 98.2 °F (36.8 °C)     Recent Labs     03/15/25  1309   WBC 7.1     Recent Labs     03/15/25  1309   BUN 38*   CREATININE 1.4*     Estimated Creatinine Clearance: 59 mL/min (A) (based on SCr of 1.4 mg/dL (H)).      Last Encounter Weight:  Wt Readings from Last 3 Encounters:   03/15/25 86.2 kg (190 lb)   07/22/19 78.5 kg (173 lb)   07/10/19 78.9 kg (174 lb)       Plan:  Loading dose of vancomycin administered: 2000mg. Followed by a scheduled dose of vancomycin 1500mg every 24 hours.   Pharmacy will continue to monitor patient and adjust therapy as indicated    VANCOMYCIN CONCENTRATION SCHEDULED FOR 03/17/2025 @0600    Thank you for the consult.  Calixto Forman RPH  3/15/2025 5:36 PM

## 2025-03-15 NOTE — PROGRESS NOTES
Ceribell Rapid EEG      Ceribell placed on pt:   Date: 3/15/2025  Time: 1810    Level of consciousness: Intubated/Sedated    Ventilated: Yes    Sedated: Yes    Initial Seizure [place] (%): 0%    Ordering Provider:  Jacquie PRATER    Pertinent Events : Seizure like activity        Study Discontinued:   Time: ***        On-call Neurologist notified of the completed exam.        Electronically signed by Teena Coombs RN on 3/15/2025 at 6:12 PM

## 2025-03-15 NOTE — PROGRESS NOTES
RENAL DOSE ADJUSTMENT MADE PER P/T PROTOCOL    PREVIOUS ORDER:  Zosyn 3.375gm Q8h extended interval infusion    Estimated Creatinine Clearance: 59 mL/min (A) (based on SCr of 1.4 mg/dL (H)).  Recent Labs     03/15/25  1309   BUN 38*   CREATININE 1.4*      INR 1.4     NEW RENALLY ADJUSTED ORDER:  Zosyn 4.5gm X 1 dose over 30 minutes then Zosyn 3.375gm Q8h extended interval infusion per [facility] P&T protocol.    Calixto Forman RPH  3/15/2025 5:02 PM

## 2025-03-15 NOTE — ED PROVIDER NOTES
respiratory about this.  CT brain C-spine pending labs show acidosis, troponins elevated do a repeat.  Patient will be mid to ICU, critically ill, cardiology also aware.  I did order amiodarone drip.  His blood pressure did drop after propofol I did stop propofol continued fluids and is did start levo. Patent had a seizure in CT scan, given 2 mg of ativan and keppra 1 g. Patient taken to ICU after CT.    CLINICAL IMPRESSION:  Final diagnoses:   Cardiac arrest (HCC)   Respiratory failure, unspecified chronicity, unspecified whether with hypoxia or hypercapnia (HCC)   Troponin level elevated   Injury of head, initial encounter   Elevated brain natriuretic peptide (BNP) level   Seizure (HCC)       (Please note that portions of this note may have been completed with a voice recognition program. Efforts were made to edit the dictations but occasionally words aremis-transcribed.)    DISPOSITION REFERRAL (if applicable):  No follow-up provider specified.    DISPOSITION MEDICATIONS (if applicable):  New Prescriptions    No medications on file          DO Bozena Rodriguez James, DO  03/15/25 6292       Javier Seals DO  03/15/25 4170

## 2025-03-16 ENCOUNTER — APPOINTMENT (OUTPATIENT)
Dept: GENERAL RADIOLOGY | Age: 60
DRG: 308 | End: 2025-03-16
Payer: OTHER GOVERNMENT

## 2025-03-16 LAB
LDLC SERPL DIRECT ASSAY-MCNC: 31 MG/DL
MRSA, DNA, NASAL: NOT DETECTED
SPECIMEN DESCRIPTION: NORMAL

## 2025-03-16 PROCEDURE — XX20X89 MONITORING OF BRAIN ELECTRICAL ACTIVITY, COMPUTER-AIDED DETECTION AND NOTIFICATION, NEW TECHNOLOGY GROUP 9: ICD-10-PCS | Performed by: STUDENT IN AN ORGANIZED HEALTH CARE EDUCATION/TRAINING PROGRAM

## 2025-03-16 PROCEDURE — 95717 EEG PHYS/QHP 2-12 HR W/O VID: CPT | Performed by: STUDENT IN AN ORGANIZED HEALTH CARE EDUCATION/TRAINING PROGRAM

## 2025-03-16 NOTE — PROGRESS NOTES
Pt tx to [facility] via [facility] at 2350. Pt did not have belongings. Pts spouse, Rica, updated on pts transfer and room number provided.

## 2025-03-16 NOTE — PROCEDURES
[facility]    Identifying Information:  Name: Tate Chavarria  MRN: [id number]  : 1965  Interpreting Physician: Haylee Mcmahon DO  Referring Provider: Katheryn Chapman DO      Clinical History:  Tate Chavarria is an 60 y.o. male with concerns for seizure like activity.      Past Medical History:  Past Medical History:   Diagnosis Date    Abnormal stress test 2017    per old chart stress test showed severe LV dysfunction and EF of 27%    Anxiety     CAD (coronary artery disease)     Chest pain     per old chart pt came in ER 2017 ( had cath done 2017) with c/o chest pain, SOB( noted on discharge notes ?NSTEMI with this admission)    Diverticulosis     hx per old chart\"told I had this in 2017- put me on antibiotics for this\"    History of kidney stones     \"had kidney stone I passed on my own yrs ago\"    HX OTHER MEDICAL     \" I should tell you, I have high pain tolerance- I am an ex- [employer]\"    Hyperlipidemia     Hypertension     MI (myocardial infarction) (HCC)     \"\"they told me I had a massive heart attack on 2017\"    OCD (obsessive compulsive disorder)     \"have OCD on Prozac for this\"        Current Medications:    sodium chloride  1,000 mL IntraVENous Once    sodium chloride  1,000 mL IntraVENous Once    sodium chloride flush  5-40 mL IntraVENous 2 times per day    ipratropium 0.5 mg-albuterol 2.5 mg  1 Dose Inhalation Q4H RT    chlorhexidine  15 mL Mouth/Throat BID    sodium chloride  1,000 mL IntraVENous Once    heparin (porcine)  5,000 Units SubCUTAneous 3 times per day    aspirin  81 mg Oral Daily    [Held by provider] furosemide  20 mg IntraVENous BID    [Held by provider] carvedilol  3.125 mg Oral BID WC    [Held by provider] lisinopril  2.5 mg Oral Daily    [START ON 3/16/2025] atorvastatin  10 mg Oral Daily    sodium chloride flush  5-40 mL IntraVENous 2 times per day    lidocaine 1 % injection  50 mg IntraDERmal Once    pantoprazole  40 mg  Dancing

## 2025-03-19 LAB
EKG ATRIAL RATE: 106 BPM
EKG ATRIAL RATE: 73 BPM
EKG DIAGNOSIS: NORMAL
EKG DIAGNOSIS: NORMAL
EKG P AXIS: 66 DEGREES
EKG P AXIS: 75 DEGREES
EKG P-R INTERVAL: 190 MS
EKG P-R INTERVAL: 206 MS
EKG Q-T INTERVAL: 338 MS
EKG Q-T INTERVAL: 442 MS
EKG QRS DURATION: 116 MS
EKG QRS DURATION: 120 MS
EKG QTC CALCULATION (BAZETT): 448 MS
EKG QTC CALCULATION (BAZETT): 486 MS
EKG R AXIS: -58 DEGREES
EKG R AXIS: -62 DEGREES
EKG T AXIS: 56 DEGREES
EKG T AXIS: 59 DEGREES
EKG VENTRICULAR RATE: 106 BPM
EKG VENTRICULAR RATE: 73 BPM

## 2025-03-19 NOTE — DISCHARGE SUMMARY
60-year-old male presented to the emergency room following cardiac arrest 3/15/2025.  Patient was subsequently transferred to ICU setting.  Evaluation included EEG monitor which revealed status epilepticus hence the patient was transferred to another tertiary center they could provide continuous EEG monitoring (hence there was no order for discharge)      Acute respiratory failure with hypoxemia following cardiac arrest  Cardiac arrest (witnessed ventricular fibrillation no patient utilized LifeVest in home setting), return of spontaneous circulation following high-quality CPR 1 round of epinephrine  Post resuscitation myoclonus (\"seizure-like activity in ER), suggestive of anoxic brain injury post resuscitation  Acute kidney injury secondary to hypoperfusion (ATN)  High anion gap metabolic acidosis secondary to post resuscitation status  Coronary artery disease prior CABG known severely depressed ejection fraction (EF 20%)  Hypertension per history  Hyperlipidemia per history  Nicotine abuse  Obsessive-compulsive disorder     Continue mechanical ventilatory support  Continue Keppra, follow-up cerebell results suggested status epilepticus hence the patient will require continuous EEG monitor at a different facility  Hemodynamic support as needed pressor agent, consultation with cardiology to assist with management  Monitor renal function, electrolytes, urine output  DVT, ulcer prophylaxis  Bronchodilator therapy given history of tobacco abuse (probable COPD)  Glycemic control     Male appears stated age orally intubated sedated vitals reviewed.  Head normal.  Pupils were reactive.  Intact corneal reflexes.  Unable to assess hearing.  Nasogastric tube, oral endotracheal tube.  Neck flat neck veins.  Chest notable for healed sternotomy, crackles noted bilaterally.  Distant cardiac tones regular rate systolic murmur.  Abdomen soft nontender bowel sounds are noted.  Extremities are somewhat cool to palpation intact

## 2025-03-21 LAB
MICROORGANISM SPEC CULT: NORMAL
MICROORGANISM SPEC CULT: NORMAL
SERVICE CMNT-IMP: NORMAL
SERVICE CMNT-IMP: NORMAL
SPECIMEN DESCRIPTION: NORMAL
SPECIMEN DESCRIPTION: NORMAL